# Patient Record
Sex: FEMALE | Race: WHITE | Employment: FULL TIME | URBAN - METROPOLITAN AREA
[De-identification: names, ages, dates, MRNs, and addresses within clinical notes are randomized per-mention and may not be internally consistent; named-entity substitution may affect disease eponyms.]

---

## 2018-03-20 ENCOUNTER — OFFICE VISIT (OUTPATIENT)
Dept: PRIMARY CARE CLINIC | Age: 33
End: 2018-03-20

## 2018-03-20 VITALS
BODY MASS INDEX: 25.33 KG/M2 | OXYGEN SATURATION: 98 % | SYSTOLIC BLOOD PRESSURE: 114 MMHG | DIASTOLIC BLOOD PRESSURE: 82 MMHG | HEART RATE: 95 BPM | TEMPERATURE: 98.7 F | WEIGHT: 152 LBS | HEIGHT: 65 IN | RESPIRATION RATE: 16 BRPM

## 2018-03-20 DIAGNOSIS — B96.89 ACUTE BACTERIAL SINUSITIS: Primary | ICD-10-CM

## 2018-03-20 DIAGNOSIS — J01.90 ACUTE BACTERIAL SINUSITIS: Primary | ICD-10-CM

## 2018-03-20 PROCEDURE — 99203 OFFICE O/P NEW LOW 30 MIN: CPT | Performed by: NURSE PRACTITIONER

## 2018-03-20 RX ORDER — AMOXICILLIN AND CLAVULANATE POTASSIUM 875; 125 MG/1; MG/1
1 TABLET, FILM COATED ORAL 2 TIMES DAILY
Qty: 20 TABLET | Refills: 0 | Status: SHIPPED | OUTPATIENT
Start: 2018-03-20 | End: 2018-03-30

## 2018-03-20 ASSESSMENT — PATIENT HEALTH QUESTIONNAIRE - PHQ9
SUM OF ALL RESPONSES TO PHQ9 QUESTIONS 1 & 2: 0
1. LITTLE INTEREST OR PLEASURE IN DOING THINGS: 0
2. FEELING DOWN, DEPRESSED OR HOPELESS: 0
SUM OF ALL RESPONSES TO PHQ QUESTIONS 1-9: 0

## 2018-03-20 NOTE — PROGRESS NOTES
CHIEF COMPLAINT    Chief Complaint   Patient presents with    URI     cough, chest/nasal congestion(clear), headaches, and sore throat x 1 week. no otc meds. HPI    Andy Romberg is a 33 y.o. female who presents with sore throat and now a cough since the onset last wednesday. The duration has been constant since the onset. The context is The patient started with a sore throat last Wednesday and then started with a cough this past Saturday. 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 .  The pain severity is 7/10. There are no alleviating factors. REVIEW OF SYSTEMS    Pulmonary: No difficulty breathing or hemoptysis  General: No fevers or syncope  GI: No vomiting or diarrhea  See HPI for further details. PAST MEDICAL AND SURGICAL HISTORY    Past Medical History:   Diagnosis Date    Diabetes mellitus ([facility] [address]     Gestational-diet controlled.      Past Surgical History:   Procedure Laterality Date    WISDOM TOOTH EXTRACTION         CURRENT MEDICATIONS    Current Outpatient Rx   Medication Sig Dispense Refill    Norgestim-Eth Estrad Triphasic (TRI-SPRINTEC PO) Take by mouth daily      amoxicillin-clavulanate (AUGMENTIN) 875-125 MG per tablet Take 1 tablet by mouth 2 times daily for 10 days 20 tablet 0       ALLERGIES    No Known Allergies    FAMILY AND SOCIAL HISTORY    Family History   Problem Relation Age of Onset    Heart Disease Mother     Arthritis Father     Learning Disabilities Sister     Learning Disabilities Brother     Miscarriages / Stillbirths Maternal Aunt     Cancer Maternal Grandmother     Miscarriages / Stillbirths Maternal Grandmother     Arthritis Paternal Grandmother     Cancer Paternal Grandfather      Social History     Social History    Marital status:      Spouse name: N/A    Number of children: N/A    Years of education: N/A     Social History Main Topics    Smoking status: Never Smoker    Smokeless tobacco: Never Used    Alcohol use No    Drug

## 2018-04-20 ENCOUNTER — OFFICE VISIT (OUTPATIENT)
Dept: INTERNAL MEDICINE CLINIC | Age: 33
End: 2018-04-20

## 2018-04-20 VITALS
HEART RATE: 76 BPM | WEIGHT: 153 LBS | TEMPERATURE: 98.1 F | RESPIRATION RATE: 16 BRPM | HEIGHT: 66 IN | BODY MASS INDEX: 24.59 KG/M2 | DIASTOLIC BLOOD PRESSURE: 68 MMHG | SYSTOLIC BLOOD PRESSURE: 114 MMHG

## 2018-04-20 DIAGNOSIS — D22.5 MELANOCYTIC NEVUS OF TRUNK: ICD-10-CM

## 2018-04-20 DIAGNOSIS — Z00.00 PREVENTATIVE HEALTH CARE: Primary | ICD-10-CM

## 2018-04-20 PROCEDURE — 99395 PREV VISIT EST AGE 18-39: CPT | Performed by: INTERNAL MEDICINE

## 2018-04-22 ASSESSMENT — ENCOUNTER SYMPTOMS
SHORTNESS OF BREATH: 0
NAUSEA: 0
EYE PAIN: 0
EYE DISCHARGE: 0
TROUBLE SWALLOWING: 0
COUGH: 0
BLOOD IN STOOL: 0
SORE THROAT: 0
CONSTIPATION: 0
DIARRHEA: 0
ABDOMINAL PAIN: 0
RHINORRHEA: 0
WHEEZING: 0
BACK PAIN: 0
ABDOMINAL DISTENTION: 0
VOMITING: 0
APNEA: 0

## 2018-10-04 ENCOUNTER — OFFICE VISIT (OUTPATIENT)
Dept: DERMATOLOGY | Age: 33
End: 2018-10-04
Payer: COMMERCIAL

## 2018-10-04 DIAGNOSIS — D22.9 MULTIPLE BENIGN MELANOCYTIC NEVI: ICD-10-CM

## 2018-10-04 DIAGNOSIS — L81.2 EPHELIDES: ICD-10-CM

## 2018-10-04 DIAGNOSIS — D48.9 NEOPLASM OF UNCERTAIN BEHAVIOR: Primary | ICD-10-CM

## 2018-10-04 DIAGNOSIS — Z78.9 NON-TOBACCO USER: ICD-10-CM

## 2018-10-04 PROCEDURE — 99243 OFF/OP CNSLTJ NEW/EST LOW 30: CPT | Performed by: DERMATOLOGY

## 2018-10-04 PROCEDURE — 11100 PR BIOPSY OF SKIN LESION: CPT | Performed by: DERMATOLOGY

## 2018-10-04 NOTE — PROGRESS NOTES
[facility]) Dermatology  [place], [place], [address]       [address]  1985    29 y.o. female     Date of Visit: 10/4/2018    Chief Complaint:   Chief Complaint   Patient presents with    New Patient    Skin Exam    Lesion(s)     trunk area        I was asked to see this patient by Dr. Jessa Arellano. History of Present Illness:  [address] is a 29 y.o. female who presents with the chief complaint of establish care and for the followin. TBSE. Many year history of multiple nevi on the trunk and extremities, all present for many years. Denies new moles. Denies moles changing in size, shape, color. None associated w/ pain, bleeding, pruritus. 2. Patient states she had a normal biopsy to her right lower back about 10-15 years ago (during her teenage years) pt  Is unsure of dermatologist's name and the dermatologist has since retired. She does not think it was abnormal or dysplastic at that time but she cannot remember. Her primary care physician and noticed the pigment returning to her scar and asked that she have the nevus examined by a dermatologist.  3. Progressive freckling located to sun exposed areas over several years, Admits to sun exposure in youth without wearing sunscreen, hats, or protective clothing. currently wears only SPF 15 sunscreen if outdoors for long periods of time. Review of Systems:  Constitutional: Reports general sense of well-being   Skin: No new or changing moles, no history of keloids or hypertrophic scars. Heme: No abnormal bruising or bleeding. Past Medical History, Family History, Surgical History, Medications and Allergies reviewed. Past Skin Hx:   Patient denies past history of melanoma, NMSC, dysplastic nevi, or chronic skin rashes.     PFHx: Denies hx of MM or NMSC    PSHx:     Family History   Problem Relation Age of Onset    Heart Disease Mother     Arthritis Father     Learning Disabilities Sister    Gary Graves

## 2018-10-16 ENCOUNTER — TELEPHONE (OUTPATIENT)
Dept: DERMATOLOGY | Age: 33
End: 2018-10-16

## 2018-10-16 DIAGNOSIS — C43.9 SUPERFICIAL SPREADING MALIGNANT MELANOMA OF SKIN (HCC): Primary | ICD-10-CM

## 2018-10-16 LAB — DERMATOLOGY PATHOLOGY REPORT: ABNORMAL

## 2018-10-18 ENCOUNTER — TELEPHONE (OUTPATIENT)
Dept: SURGERY | Age: 33
End: 2018-10-18

## 2018-10-23 ENCOUNTER — OFFICE VISIT (OUTPATIENT)
Dept: SURGERY | Age: 33
End: 2018-10-23
Payer: COMMERCIAL

## 2018-10-23 VITALS
DIASTOLIC BLOOD PRESSURE: 64 MMHG | TEMPERATURE: 98.3 F | HEART RATE: 77 BPM | BODY MASS INDEX: 24.75 KG/M2 | WEIGHT: 154 LBS | HEIGHT: 66 IN | SYSTOLIC BLOOD PRESSURE: 106 MMHG | OXYGEN SATURATION: 98 %

## 2018-10-23 DIAGNOSIS — C43.59 MALIGNANT MELANOMA OF TORSO EXCLUDING BREAST (HCC): Primary | ICD-10-CM

## 2018-10-23 PROCEDURE — 99244 OFF/OP CNSLTJ NEW/EST MOD 40: CPT | Performed by: SURGERY

## 2018-10-29 ENCOUNTER — TELEPHONE (OUTPATIENT)
Dept: INTERNAL MEDICINE CLINIC | Age: 33
End: 2018-10-29

## 2018-10-29 NOTE — PROGRESS NOTES
[facility] / [facility] ([facility]) [address] [facility], [address]    Acknowledgment of Informed Consent for Surgical or Medical Procedure and Sedation  I agree to allow doctor(s) Iqra Kitchen and his/her associates or assistants, including residents and/or other qualified medical practitioner to perform the following medical treatment or procedure and to administer or direct the administration of sedation as necessary:  Procedure(s): [address]  My doctor has explained the following regarding the proposed procedure:   the explanation of the procedure   the benefits of the procedure   the potential problems that might occur during recuperation   the risks and side effects of the procedure which could include but are not limited to severe blood loss, infection, stroke or death   the benefits, risks and side effect of alternative procedures including the consequences of declining this procedure or any alternative procedures   the likelihood of achieving satisfactory results. I acknowledge no guarantee or assurance has been made to me regarding the results. I understand that during the course of this treatment/procedure, unforeseen conditions can occur which require an additional or different procedure. I agree to allow my physician or assistants to perform such extension of the original procedure as they may find necessary. I understand that sedation will often result in temporary impairment of memory and fine motor skills and that sedation can occasionally progress to a state of deep sedation or general anesthesia. I understand the risks of anesthesia for surgery include, but are not limited to, sore throat, hoarseness, injury to face, mouth, or teeth; nausea; headache; injury to blood vessels or nerves; death, brain damage, or paralysis.     I understand that if I have a Limitation of Treatment order in effect during my hospitalization, the order may or may not

## 2018-10-30 ENCOUNTER — TELEPHONE (OUTPATIENT)
Dept: DERMATOLOGY | Age: 33
End: 2018-10-30

## 2018-10-30 ENCOUNTER — OFFICE VISIT (OUTPATIENT)
Dept: INTERNAL MEDICINE CLINIC | Age: 33
End: 2018-10-30
Payer: COMMERCIAL

## 2018-10-30 VITALS
SYSTOLIC BLOOD PRESSURE: 102 MMHG | RESPIRATION RATE: 16 BRPM | TEMPERATURE: 99 F | HEIGHT: 66 IN | BODY MASS INDEX: 24.43 KG/M2 | WEIGHT: 152 LBS | DIASTOLIC BLOOD PRESSURE: 60 MMHG | HEART RATE: 76 BPM

## 2018-10-30 DIAGNOSIS — Z01.818 PREOP EXAM FOR INTERNAL MEDICINE: ICD-10-CM

## 2018-10-30 DIAGNOSIS — C43.59 MALIGNANT MELANOMA OF TORSO EXCLUDING BREAST (HCC): Primary | ICD-10-CM

## 2018-10-30 DIAGNOSIS — O24.410 DIET CONTROLLED GESTATIONAL DIABETES MELLITUS (GDM) IN SECOND TRIMESTER: ICD-10-CM

## 2018-10-30 PROCEDURE — 99241 PR OFFICE CONSULTATION NEW/ESTAB PATIENT 15 MIN: CPT | Performed by: INTERNAL MEDICINE

## 2018-10-30 NOTE — PATIENT INSTRUCTIONS
art to relieve stress. Some dance, writing, or art groups may be available just for people who have cancer. ¨ Be kind to your body and mind. Getting enough sleep, eating a healthy diet, and taking time to do things you enjoy can contribute to an overall feeling of balance in your life and help reduce stress. ¨ Get help if you need it. Discuss your concerns with your doctor or counselor. · If you are vomiting or have diarrhea:  ¨ Drink plenty of fluids (enough so that your urine is light yellow or clear like water) to prevent dehydration. Choose water and other caffeine-free clear liquids. If you have kidney, heart, or liver disease and have to limit fluids, talk with your doctor before you increase the amount of fluids you drink. ¨ When you are able to eat, try clear soups, mild foods, and liquids until all symptoms are gone for 12 to 48 hours. Other good choices include dry toast, crackers, cooked cereal, and gelatin dessert, such as Jell-O.  · Do not smoke. Smoking can slow healing. If you need help quitting, talk to your doctor about stop-smoking programs and medicines. These can increase your chances of quitting for good. · If you have not already done so, prepare a list of advance directives. Advance directives are instructions to your doctor and family members about what kind of care you want if you become unable to speak or express yourself. Protect your skin  · Always wear sunscreen on exposed skin. Make sure to use a broad-spectrum sunscreen that has a sun protection factor (SPF) of 30 or higher. Use it every day, even when it is cloudy. While you are outdoors, apply more sunscreen every 2 to 3 hours or anytime your skin gets wet. · Wear a wide-brimmed hat, a long-sleeved shirt, and pants if you are going to be outdoors for very long. · Stay out of the sun during the midday hours (10 a.m. to 4 p.m.), when UV rays are strongest.  · Avoid sunlamps and tanning salons.   When should you call for help?  Call your doctor now or seek immediate medical care if:    · You have signs of infection, such as:  ¨ Increased pain, swelling, warmth, or redness. ¨ Red streaks leading from the area. ¨ Pus draining from the area. ¨ A fever.    Watch closely for changes in your health, and be sure to contact your doctor if:    · You see a change in your skin, such as a growth or mole that:  ¨ Grows bigger. This may happen very slowly. ¨ Changes color. ¨ Changes shape. ¨ Starts to bleed easily.     · You have swollen glands in your armpits, groin, or neck.     · You do not get better as expected. Where can you learn more? Go to https://Dopios.Demeure. org and sign in to your Exclusively.in account. Enter E206 in the [employer] box to learn more about \"Melanoma: Care Instructions. \"     If you do not have an account, please click on the \"Sign Up Now\" link. Current as of: May 12, 2017  Content Version: 11.7  © 9413-0704 [employer]. Care instructions adapted under license by [facility] ([facility]). If you have questions about a medical condition or this instruction, always ask your healthcare professional. [address] any warranty or liability for your use of this information.

## 2018-10-30 NOTE — PROGRESS NOTES
Pre-operative evaluation    10/30/2018    Yolanda Bursn  [id number]    Mr. Yolanda Burns is here for a preoperative evaluation. She is planned for melanoma removal 11/7/2018, it's 4mm depth currently. Has hx of gestational DM, no other issues. Patient denies chest pain, SOB, NVD, FC, rash, malaise, rigor, dizziness/lightheadness, other pertinent ROS was also reviewed. /60 (Site: Right Upper Arm, Position: Sitting, Cuff Size: Medium Adult)   Pulse 76 Comment: Regular  Temp 99 °F (37.2 °C) (Oral)   Resp 16   Ht 5' 6\" (1.676 m)   Wt 152 lb (68.9 kg)   LMP 10/10/2018   BMI 24.53 kg/m²     Gen: Patient appears well groomed, well appearing  HEAD: Atraumatic, normocephalic,   Eyes: PERRLA, EOMI   Neck: supple, no thyroid nodule appreciated, no JVD  Chest: Clear to auscultation KEN, unlabored breathing, normal expansion  Heart: Regular rate, regular rhythm, no murmur, no rub  Abdomen: Non-tender, non-distended, bowel sounds present x3  Extremities: no edema, distal pulses intact  Patient was alert and oriented to person, place and time  Integument: right lower lumbar region with excision scar and keratosis noted ~1.5cm diameter    [address], [address] was seen today for pre-op exam and health maintenance. Diagnoses and all orders for this visit:    Preop exam for internal medicine  Malignant melanoma of torso excluding breast ([facility] [address]  Preoperative evaluation- from a cardiovascular stand point, patient is at low to moderate risk for proposed operation, but the risk is acceptable. Risk of complications of any surgery, including but not limited to MI, CVA, and death was explained. From a medical perspective the proposed surgery is urgent and is warranted since benefit>risks. Clear to proceed from medical perspective. Diet controlled gestational diabetes mellitus (GDM) in second trimester  Controlled?   Recheck A1c, she did not get the labs done ordered by previous PCP, will get done today    Current

## 2018-11-06 ENCOUNTER — ANESTHESIA EVENT (OUTPATIENT)
Dept: OPERATING ROOM | Age: 33
End: 2018-11-06
Payer: COMMERCIAL

## 2018-11-07 ENCOUNTER — ANESTHESIA (OUTPATIENT)
Dept: OPERATING ROOM | Age: 33
End: 2018-11-07
Payer: COMMERCIAL

## 2018-11-07 ENCOUNTER — HOSPITAL ENCOUNTER (OUTPATIENT)
Age: 33
Setting detail: OUTPATIENT SURGERY
Discharge: HOME | End: 2018-11-07
Attending: SURGERY | Admitting: SURGERY
Payer: COMMERCIAL

## 2018-11-07 VITALS
BODY MASS INDEX: 24.11 KG/M2 | SYSTOLIC BLOOD PRESSURE: 121 MMHG | HEART RATE: 88 BPM | RESPIRATION RATE: 16 BRPM | OXYGEN SATURATION: 99 % | DIASTOLIC BLOOD PRESSURE: 77 MMHG | HEIGHT: 66 IN | TEMPERATURE: 98.2 F | WEIGHT: 150 LBS

## 2018-11-07 VITALS — DIASTOLIC BLOOD PRESSURE: 84 MMHG | SYSTOLIC BLOOD PRESSURE: 129 MMHG | OXYGEN SATURATION: 99 %

## 2018-11-07 DIAGNOSIS — C43.59 MALIGNANT MELANOMA OF TORSO EXCLUDING BREAST (HCC): Primary | ICD-10-CM

## 2018-11-07 LAB — PREGNANCY, URINE: NEGATIVE

## 2018-11-07 PROCEDURE — 13101 CMPLX RPR TRUNK 2.6-7.5 CM: CPT | Performed by: SURGERY

## 2018-11-07 PROCEDURE — 3700000000 HC ANESTHESIA ATTENDED CARE: Performed by: SURGERY

## 2018-11-07 PROCEDURE — 2580000003 HC RX 258: Performed by: SURGERY

## 2018-11-07 PROCEDURE — 3600000002 HC SURGERY LEVEL 2 BASE: Performed by: SURGERY

## 2018-11-07 PROCEDURE — 88305 TISSUE EXAM BY PATHOLOGIST: CPT

## 2018-11-07 PROCEDURE — 2500000003 HC RX 250 WO HCPCS: Performed by: SURGERY

## 2018-11-07 PROCEDURE — 7100000011 HC PHASE II RECOVERY - ADDTL 15 MIN: Performed by: SURGERY

## 2018-11-07 PROCEDURE — 2580000003 HC RX 258: Performed by: NURSE ANESTHETIST, CERTIFIED REGISTERED

## 2018-11-07 PROCEDURE — 7100000001 HC PACU RECOVERY - ADDTL 15 MIN: Performed by: SURGERY

## 2018-11-07 PROCEDURE — 6360000002 HC RX W HCPCS: Performed by: SURGERY

## 2018-11-07 PROCEDURE — 2580000003 HC RX 258: Performed by: ANESTHESIOLOGY

## 2018-11-07 PROCEDURE — 84703 CHORIONIC GONADOTROPIN ASSAY: CPT

## 2018-11-07 PROCEDURE — 2500000003 HC RX 250 WO HCPCS: Performed by: NURSE ANESTHETIST, CERTIFIED REGISTERED

## 2018-11-07 PROCEDURE — 3600000012 HC SURGERY LEVEL 2 ADDTL 15MIN: Performed by: SURGERY

## 2018-11-07 PROCEDURE — 7100000010 HC PHASE II RECOVERY - FIRST 15 MIN: Performed by: SURGERY

## 2018-11-07 PROCEDURE — 7100000000 HC PACU RECOVERY - FIRST 15 MIN: Performed by: SURGERY

## 2018-11-07 PROCEDURE — 3700000001 HC ADD 15 MINUTES (ANESTHESIA): Performed by: SURGERY

## 2018-11-07 PROCEDURE — 13102 CMPLX RPR TRUNK ADDL 5CM/<: CPT | Performed by: SURGERY

## 2018-11-07 PROCEDURE — 6360000002 HC RX W HCPCS: Performed by: NURSE ANESTHETIST, CERTIFIED REGISTERED

## 2018-11-07 PROCEDURE — 2709999900 HC NON-CHARGEABLE SUPPLY: Performed by: SURGERY

## 2018-11-07 PROCEDURE — 11604 EXC TR-EXT MAL+MARG 3.1-4 CM: CPT | Performed by: SURGERY

## 2018-11-07 RX ORDER — MAGNESIUM HYDROXIDE 1200 MG/15ML
LIQUID ORAL CONTINUOUS PRN
Status: DISCONTINUED | OUTPATIENT
Start: 2018-11-07 | End: 2018-11-07 | Stop reason: HOSPADM

## 2018-11-07 RX ORDER — BUPIVACAINE HYDROCHLORIDE AND EPINEPHRINE 5; 5 MG/ML; UG/ML
INJECTION, SOLUTION EPIDURAL; INTRACAUDAL; PERINEURAL PRN
Status: DISCONTINUED | OUTPATIENT
Start: 2018-11-07 | End: 2018-11-07 | Stop reason: HOSPADM

## 2018-11-07 RX ORDER — PROPOFOL 10 MG/ML
INJECTION, EMULSION INTRAVENOUS PRN
Status: DISCONTINUED | OUTPATIENT
Start: 2018-11-07 | End: 2018-11-07 | Stop reason: SDUPTHER

## 2018-11-07 RX ORDER — ONDANSETRON 2 MG/ML
INJECTION INTRAMUSCULAR; INTRAVENOUS PRN
Status: DISCONTINUED | OUTPATIENT
Start: 2018-11-07 | End: 2018-11-07 | Stop reason: SDUPTHER

## 2018-11-07 RX ORDER — SODIUM CHLORIDE, SODIUM LACTATE, POTASSIUM CHLORIDE, CALCIUM CHLORIDE 600; 310; 30; 20 MG/100ML; MG/100ML; MG/100ML; MG/100ML
INJECTION, SOLUTION INTRAVENOUS CONTINUOUS
Status: DISCONTINUED | OUTPATIENT
Start: 2018-11-07 | End: 2018-11-07 | Stop reason: HOSPADM

## 2018-11-07 RX ORDER — LIDOCAINE HYDROCHLORIDE 10 MG/ML
1 INJECTION, SOLUTION EPIDURAL; INFILTRATION; INTRACAUDAL; PERINEURAL
Status: DISCONTINUED | OUTPATIENT
Start: 2018-11-07 | End: 2018-11-07 | Stop reason: HOSPADM

## 2018-11-07 RX ORDER — FENTANYL CITRATE 50 UG/ML
50 INJECTION, SOLUTION INTRAMUSCULAR; INTRAVENOUS EVERY 5 MIN PRN
Status: DISCONTINUED | OUTPATIENT
Start: 2018-11-07 | End: 2018-11-07 | Stop reason: HOSPADM

## 2018-11-07 RX ORDER — SODIUM CHLORIDE 0.9 % (FLUSH) 0.9 %
10 SYRINGE (ML) INJECTION PRN
Status: DISCONTINUED | OUTPATIENT
Start: 2018-11-07 | End: 2018-11-07 | Stop reason: HOSPADM

## 2018-11-07 RX ORDER — FENTANYL CITRATE 50 UG/ML
25 INJECTION, SOLUTION INTRAMUSCULAR; INTRAVENOUS EVERY 5 MIN PRN
Status: DISCONTINUED | OUTPATIENT
Start: 2018-11-07 | End: 2018-11-07 | Stop reason: HOSPADM

## 2018-11-07 RX ORDER — SODIUM CHLORIDE, SODIUM LACTATE, POTASSIUM CHLORIDE, CALCIUM CHLORIDE 600; 310; 30; 20 MG/100ML; MG/100ML; MG/100ML; MG/100ML
INJECTION, SOLUTION INTRAVENOUS CONTINUOUS PRN
Status: DISCONTINUED | OUTPATIENT
Start: 2018-11-07 | End: 2018-11-07 | Stop reason: SDUPTHER

## 2018-11-07 RX ORDER — SODIUM CHLORIDE 0.9 % (FLUSH) 0.9 %
10 SYRINGE (ML) INJECTION EVERY 12 HOURS SCHEDULED
Status: DISCONTINUED | OUTPATIENT
Start: 2018-11-07 | End: 2018-11-07 | Stop reason: HOSPADM

## 2018-11-07 RX ORDER — LIDOCAINE HYDROCHLORIDE 20 MG/ML
INJECTION, SOLUTION INFILTRATION; PERINEURAL PRN
Status: DISCONTINUED | OUTPATIENT
Start: 2018-11-07 | End: 2018-11-07 | Stop reason: SDUPTHER

## 2018-11-07 RX ORDER — OXYCODONE HYDROCHLORIDE AND ACETAMINOPHEN 5; 325 MG/1; MG/1
1 TABLET ORAL EVERY 6 HOURS PRN
Qty: 20 TABLET | Refills: 0 | Status: SHIPPED | OUTPATIENT
Start: 2018-11-07 | End: 2018-11-14

## 2018-11-07 RX ORDER — ONDANSETRON 2 MG/ML
4 INJECTION INTRAMUSCULAR; INTRAVENOUS
Status: DISCONTINUED | OUTPATIENT
Start: 2018-11-07 | End: 2018-11-07 | Stop reason: HOSPADM

## 2018-11-07 RX ORDER — DEXAMETHASONE SODIUM PHOSPHATE 4 MG/ML
INJECTION, SOLUTION INTRA-ARTICULAR; INTRALESIONAL; INTRAMUSCULAR; INTRAVENOUS; SOFT TISSUE PRN
Status: DISCONTINUED | OUTPATIENT
Start: 2018-11-07 | End: 2018-11-07 | Stop reason: SDUPTHER

## 2018-11-07 RX ORDER — CEFAZOLIN SODIUM 2 G/50ML
2 SOLUTION INTRAVENOUS ONCE
Status: COMPLETED | OUTPATIENT
Start: 2018-11-07 | End: 2018-11-07

## 2018-11-07 RX ORDER — PROMETHAZINE HYDROCHLORIDE 25 MG/ML
6.25 INJECTION, SOLUTION INTRAMUSCULAR; INTRAVENOUS
Status: DISCONTINUED | OUTPATIENT
Start: 2018-11-07 | End: 2018-11-07 | Stop reason: HOSPADM

## 2018-11-07 RX ORDER — PROPOFOL 10 MG/ML
INJECTION, EMULSION INTRAVENOUS CONTINUOUS PRN
Status: DISCONTINUED | OUTPATIENT
Start: 2018-11-07 | End: 2018-11-07 | Stop reason: SDUPTHER

## 2018-11-07 RX ADMIN — PROPOFOL 50 MG: 10 INJECTION, EMULSION INTRAVENOUS at 14:34

## 2018-11-07 RX ADMIN — PROPOFOL 190 MCG/KG/MIN: 10 INJECTION, EMULSION INTRAVENOUS at 14:34

## 2018-11-07 RX ADMIN — CEFAZOLIN SODIUM 2 G: 2 SOLUTION INTRAVENOUS at 14:29

## 2018-11-07 RX ADMIN — DEXAMETHASONE SODIUM PHOSPHATE 8 MG: 4 INJECTION, SOLUTION INTRAMUSCULAR; INTRAVENOUS at 14:35

## 2018-11-07 RX ADMIN — SODIUM CHLORIDE, SODIUM LACTATE, POTASSIUM CHLORIDE, AND CALCIUM CHLORIDE: 600; 310; 30; 20 INJECTION, SOLUTION INTRAVENOUS at 15:21

## 2018-11-07 RX ADMIN — LIDOCAINE HYDROCHLORIDE 100 MG: 20 INJECTION, SOLUTION INFILTRATION; PERINEURAL at 14:34

## 2018-11-07 RX ADMIN — HYDROMORPHONE HYDROCHLORIDE 1 MG: 1 INJECTION, SOLUTION INTRAMUSCULAR; INTRAVENOUS; SUBCUTANEOUS at 14:31

## 2018-11-07 RX ADMIN — ONDANSETRON 7 MG: 2 INJECTION INTRAMUSCULAR; INTRAVENOUS at 14:24

## 2018-11-07 RX ADMIN — SODIUM CHLORIDE, SODIUM LACTATE, POTASSIUM CHLORIDE, AND CALCIUM CHLORIDE: 600; 310; 30; 20 INJECTION, SOLUTION INTRAVENOUS at 13:16

## 2018-11-07 RX ADMIN — HYDROMORPHONE HYDROCHLORIDE 1 MG: 1 INJECTION, SOLUTION INTRAMUSCULAR; INTRAVENOUS; SUBCUTANEOUS at 14:24

## 2018-11-07 RX ADMIN — SODIUM CHLORIDE, POTASSIUM CHLORIDE, SODIUM LACTATE AND CALCIUM CHLORIDE: 600; 310; 30; 20 INJECTION, SOLUTION INTRAVENOUS at 12:30

## 2018-11-07 ASSESSMENT — PAIN - FUNCTIONAL ASSESSMENT: PAIN_FUNCTIONAL_ASSESSMENT: 0-10

## 2018-11-07 ASSESSMENT — PULMONARY FUNCTION TESTS
PIF_VALUE: 0
PIF_VALUE: 1
PIF_VALUE: 0
PIF_VALUE: 1
PIF_VALUE: 1
PIF_VALUE: 0
PIF_VALUE: 0

## 2018-11-07 ASSESSMENT — PAIN DESCRIPTION - DESCRIPTORS: DESCRIPTORS: ACHING

## 2018-11-07 ASSESSMENT — PAIN SCALES - GENERAL: PAINLEVEL_OUTOF10: 0

## 2018-11-07 NOTE — ANESTHESIA PRE PROCEDURE
BP Readings from Last 3 Encounters:   11/07/18 121/83   10/30/18 102/60   10/23/18 106/64       NPO Status:                                                                                 BMI:   Wt Readings from Last 3 Encounters:   11/07/18 150 lb (68 kg)   10/30/18 152 lb (68.9 kg)   10/23/18 154 lb (69.9 kg)     Body mass index is 24.21 kg/m². CBC:   Lab Results   Component Value Date    WBC 8.0 10/30/2018    RBC 4.68 10/30/2018    HGB 14.6 10/30/2018    HCT 44.2 10/30/2018    MCV 94.3 10/30/2018    RDW 14.0 10/30/2018     10/30/2018       CMP:   Lab Results   Component Value Date     10/30/2018    K 4.5 10/30/2018     10/30/2018    CO2 26 10/30/2018    BUN 14 10/30/2018    CREATININE 0.7 10/30/2018    GFRAA >60 10/30/2018    GFRAA >60 11/17/2011    LABGLOM >60 10/30/2018    GLUCOSE 75 10/30/2018    CALCIUM 9.8 10/30/2018       POC Tests: No results for input(s): POCGLU, POCNA, POCK, POCCL, POCBUN, POCHEMO, POCHCT in the last 72 hours.     Coags: No results found for: PROTIME, INR, APTT    HCG (If Applicable): No results found for: PREGTESTUR, PREGSERUM, HCG, HCGQUANT     ABGs: No results found for: PHART, PO2ART, WLF1GKJ, HFS6WNX, BEART, G2OGWIBB     Type & Screen (If Applicable):  Lab Results   Component Value Date    LABABO A 04/15/2013    [address] Positive 04/15/2013       Anesthesia Evaluation  Patient summary reviewed and Nursing notes reviewed no history of anesthetic complications:   Airway: Mallampati: II  TM distance: >3 FB   Neck ROM: full  Mouth opening: > = 3 FB Dental: normal exam         Pulmonary:Negative Pulmonary ROS and normal exam  breath sounds clear to auscultation                             Cardiovascular:Negative CV ROS            Rhythm: regular  Rate: normal                    Neuro/Psych:   Negative Neuro/Psych ROS  (+) headaches: migraine headaches,             GI/Hepatic/Renal: Neg GI/Hepatic/Renal ROS       (-) hiatal hernia and

## 2018-11-07 NOTE — PROGRESS NOTES
PACU Transfer to \[facility]\""    Vitals:    11/07/18 1615   BP: 128/76   Pulse: 80   Resp: 14   Temp: 99.6 °F (37.6 °C)   SpO2: 98%         Intake/Output Summary (Last 24 hours) at 11/07/18 1621  Last data filed at 11/07/18 1615   Gross per 24 hour   Intake             2250 ml   Output               10 ml   Net             2240 ml   IV w/850 LTC    Pain assessment: Received local intra op; has consistently denied pain in PACU  Pain Level: 0  Surgical assessment good. Transferred in stretcher to \[facility]\""#5 by transporter, to progress to d/c.   Wait room called: will send family member to \[facility]\""#5 to join pt      11/7/2018 4:21 PM

## 2018-11-12 ENCOUNTER — TELEPHONE (OUTPATIENT)
Dept: SURGERY | Age: 33
End: 2018-11-12

## 2018-11-12 NOTE — TELEPHONE ENCOUNTER
Follow up scheduled and patient request paperwork faxed to employer and return to work date of Wednesday 11-14-18 with no restrictions.

## 2018-11-12 NOTE — OP NOTE
Lane Joy  1985    PREOPERATIVE DIAGNOSIS: Malignant Melanoma back     POSTOPERATIVE DIAGNOSIS: Same     PROCEDURE PERFORMED: Wide local excision and advancement flap closure    ATTENDING SURGEON: MD Earl Rucker MD     ANESTHESIA: MAC and local.     BLOOD LOSS: 5 ml   COMPLICATIONS : None  DRAINS: None   SPECIMENS REMOVED: tumor with skin     INDICATIONS FOR OPERATION:  Discussed with patient about wide excision of the melanoma. The risks of surgery include infection, bleeding, and recurrence of the lesion were explained. Patient verbalized understanding of the risks and benefits and wishes to proceed. DETAILS OF PROCEDURE: Patient was identified in the preoperative area and brought to the operating room. Monitored anesthesia given. Operative site is prepped and draped in a sterile manner. Timeout procedure was performed confirming the procedure, site and administration of antibiotics. Lesion and margin marked for the excision. The lesion was measuring 1.3 cm x 1 cm. A margin of 1 cm was taken on all sides and it was converted into an eyeball shape to allow for the closure. Local anesthesia infiltrated. Skin incised with scalpel. Incision deepened to subcutaneous tissues. Electrocautery used for further division of tissues. Skin along with subcutaneous fat until fascia excised and oriented with sutures. Specimen sent to pathology. Hemostasis checked and achieved. Because the defect size, flaps are raised on all sides with diathermy. Flaps are raised above the fascia. The wound was closed in layers with interrupted 2-0 Vicryl subcutaneous and 3-0 Vicryl deep dermal sutures. Skin was closed with 3-0 vicryl dermal sutures. Derma flex with preneo placed over the closed incision. Telfa, gauze and tegaderm clear dressing applied. The patient tolerated the procedure well. Patient was awakened and transferred to the recovery room uneventfully.    I was present for the entire procedure.     Halley Caldwell MD  Surgical Oncologist

## 2018-11-13 NOTE — PROGRESS NOTES
Vandana Alvaradoseema is here for postop evaluation. She is gradually improving since then. Minimal pain now. No other complaints. O/E: Alert, oriented x 3, sitting comfortably with out any discomfort  No respiratory distress  Postop wound - healing well. A/P: S/P Melanoma wide excision 11/7/18, doing well  No postop complications  Path as below - follow with dermatologist, Dr. Tidwell Later  Follow up in 6 months    Pathology: FINAL DIAGNOSIS:    Skin lower back, excision for melanoma:  * Skin and subcutaneous tissue showing changes consistent with previous  resection/biopsy. * No evidence of residual melanoma.

## 2018-11-16 ENCOUNTER — OFFICE VISIT (OUTPATIENT)
Dept: SURGERY | Age: 33
End: 2018-11-16

## 2018-11-16 VITALS
WEIGHT: 155 LBS | DIASTOLIC BLOOD PRESSURE: 79 MMHG | HEART RATE: 81 BPM | OXYGEN SATURATION: 100 % | HEIGHT: 66 IN | BODY MASS INDEX: 24.91 KG/M2 | SYSTOLIC BLOOD PRESSURE: 124 MMHG | TEMPERATURE: 97.5 F

## 2018-11-16 DIAGNOSIS — C43.59 MALIGNANT MELANOMA OF TORSO EXCLUDING BREAST (HCC): Primary | ICD-10-CM

## 2018-11-16 DIAGNOSIS — Z98.890 POSTOPERATIVE STATE: ICD-10-CM

## 2018-11-16 PROCEDURE — 99024 POSTOP FOLLOW-UP VISIT: CPT | Performed by: SURGERY

## 2019-01-22 ENCOUNTER — OFFICE VISIT (OUTPATIENT)
Dept: DERMATOLOGY | Age: 34
End: 2019-01-22
Payer: COMMERCIAL

## 2019-01-22 DIAGNOSIS — Z85.820 HISTORY OF MALIGNANT MELANOMA: ICD-10-CM

## 2019-01-22 DIAGNOSIS — D22.9 MULTIPLE BENIGN MELANOCYTIC NEVI: Primary | ICD-10-CM

## 2019-01-22 PROCEDURE — 99213 OFFICE O/P EST LOW 20 MIN: CPT | Performed by: DERMATOLOGY

## 2019-04-19 ENCOUNTER — OFFICE VISIT (OUTPATIENT)
Dept: DERMATOLOGY | Age: 34
End: 2019-04-19
Payer: COMMERCIAL

## 2019-04-19 DIAGNOSIS — Z85.820 HISTORY OF MELANOMA: ICD-10-CM

## 2019-04-19 DIAGNOSIS — D22.9 MULTIPLE BENIGN MELANOCYTIC NEVI: Primary | ICD-10-CM

## 2019-04-19 PROCEDURE — 99213 OFFICE O/P EST LOW 20 MIN: CPT | Performed by: DERMATOLOGY

## 2019-04-19 NOTE — PROGRESS NOTES
[facility]) Dermatology  [facility] [place], [facility]       Nora Neal  1985    29 y.o. female     Date of Visit: 2019    Chief Complaint:   Chief Complaint   Patient presents with    Follow-up    Skin Lesion     moles, TBSE        I was asked to see this patient by Dr. Odell ref. provider found. History of Present Illness:  Nora Neal is a 29 y.o. female who presents with the chief complaint of the followin. Total body skin cancer screening, hx of melanoma, denies recurrence. 2. Many year history of multiple nevi on the trunk and extremities, all present for many years. Denies new moles. Denies moles changing in size, shape, color. None associated w/ pain, bleeding, pruritus.      -Admits to sun exposure in youth without wearing sunscreen, hats, or protective clothing.    -Tanning bed use: yes, stopped over 10 years ago  -Occupation: indoor   -Uses sunscreen and/or wears protective clothing: yes when outdoors in warmer months, does not wear a daily moisturizer with sunscreen    Review of Systems:  Constitutional: Reports general sense of well-being   Skin: No new or changing moles, no history of keloids or hypertrophic scars. Heme: No abnormal bruising or bleeding. Past Medical History, Family History, Surgical History, Medications and Allergies reviewed.     Past Skin Hx:  Right inferior back- superficial spreading malignant melanoma Stage 1a, Decision dx: class 1a excised with WLE by Dr. Luiza Watson on 18  Patient denies past history of melanoma, NMSC, dysplastic nevi, or chronic skin rashes.     PFHx: Denies hx of MM or NMSC      Family History   Problem Relation Age of Onset    Heart Disease Mother     Arthritis Father     Learning Disabilities Sister     Learning Disabilities Brother     Miscarriages / Stillbirths Maternal Aunt     Cancer Maternal Grandmother     Miscarriages / Stillbirths Maternal Grandmother     Arthritis Paternal Grandmother     Cancer Paternal Grandfather      Past Medical History:   Diagnosis Date    Migraine      Past Surgical History:   Procedure Laterality Date    NV EXC SKIN BENIG <5MM TRUNK,ARM,LEG N/A 11/7/2018    EXCISION OF BACK MELANOMA performed by Sage Doan MD at [address] EXTRACTION         No Known Allergies  Outpatient Medications Marked as Taking for the 4/19/19 encounter (Office Visit) with Sejal Orozco, DO   Medication Sig Dispense Refill    Norgestim-Eth Estrad Triphasic (TRI-SPRINTEC PO) Take by mouth daily         Social History:   Social History     Socioeconomic History    Marital status:      Spouse name: Not on file    Number of children: Not on file    Years of education: Not on file    Highest education level: Not on file   Occupational History    Not on file   Social Needs    Financial resource strain: Not on file    Food insecurity:     Worry: Not on file     Inability: Not on file    Transportation needs:     Medical: Not on file     Non-medical: Not on file   Tobacco Use    Smoking status: Never Smoker    Smokeless tobacco: Never Used   Substance and Sexual Activity    Alcohol use: Yes     Comment: Occasionally    Drug use: No    Sexual activity: Yes     Partners: Male   Lifestyle    Physical activity:     Days per week: Not on file     Minutes per session: Not on file    Stress: Not on file   Relationships    Social connections:     Talks on phone: Not on file     Gets together: Not on file     Attends Judaism service: Not on file     Active member of club or organization: Not on file     Attends meetings of clubs or organizations: Not on file     Relationship status: Not on file    Intimate partner violence:     Fear of current or ex partner: Not on file     Emotionally abused: Not on file     Physically abused: Not on file     Forced sexual activity: Not on file   Other Topics Concern    Not on file   Social History Narrative    Not on file Physical Examination     The following were examined and determined to be normal: Psych/Neuro, Scalp/hair, Head/face, Conjunctivae/eyelids, Gums/teeth/lips, Neck, Breast/axilla/chest, Abdomen, Back, RUE, LUE, RLE, LLE and Nails/digits. Groin/buttocks. Areas covered by underwear garment(s) not examined. The following were examined and determined to be abnormal: none. -General: NAD, well-nourished, well-developed.  -Lymphatics: No palpable lymph nodes to cervical, supraclavicular, axillary areas  Total body skin exam performed, areas examined listed above:   1. Scattered on the head,neck, trunk and extremities are multiple well-defined round and oval symmetric smoothly-bordered uniformly brown macules and papules. no change in size/shape/color of any lesions; no bleeding lesions. 2. Right inferior back- Well healed scar at site of MM, no evidence of recurrent pigmentation, no nodules on palpation. Assessment and Plan     1. Multiple benign melanocytic nevi    2. History of melanoma        1. Multiple benign melanocytic nevi  Benign acquired melanocytic nevi  -Recommend monthly self skin exams   -Educated regarding the ABCDEs of melanoma detection   -Call for any new/changing moles or concerning lesions  -Reviewed sun protective behavior -- sun avoidance during the peak hours of the day, sun-protective clothing (including hat and sunglasses), sunscreen use (water resistant, broad spectrum, SPF at least 30, need for reapplication every 2 to 3 hours), avoidance of tanning beds   -Plan: Observation with annual skin checks (earlier if indicated) performed in office to monitor current nevi and to assess for new lesions.     2. History of melanoma  -Well healed scar at site of prior melanoma, no evidence of recurrence  -pt to call if notices any changes in size, shape, color or experiences bleeding/pain/itching of moles  -Recommend follow-up annually with an ophthalmologist.  -understands risk of recurrence and the need to monitor skin for changes    RTC 3 months for TBSE        Note is transcribed using voice recognition software. Inadvertent computerized transcription errors may be present. Return in about 3 months (around 7/19/2019) for TBSE.

## 2019-05-09 NOTE — PROGRESS NOTES
Melanoma Follow-up  Eladio Hansen MD  Surgical Oncology  [phone]    Date of service: 5/14/2019     Estella Sosa    Initial Melanoma:   Location: Right medial inferior back. Date initially Treated:  11/7/18. Thickness 0.4 mm. Ulceration  No                                Mitotic Rate 1 /mm2      Regression  No  Stage [id number]    Subsequent Melanoma: no    Estella Sosa returns for follow up of her melanoma of the back. She is basically doing well. Today She denies new subcutaneous mass, headache, bone pain, cough, abdominal pain, or suspicious new lesions. She is being followed by Dr. Mayra Lieberman. No other complaints. Review of systems is otherwise negative    Past medical history, Past surgical history, Social history, Family history and allergies reviewed and updated. Vitals:    05/14/19 0906   BP: 120/74   Pulse: 83   Temp: 98.7 °F (37.1 °C)   TempSrc: Oral   SpO2: 99%   Weight: 155 lb (70.3 kg)   Height: 5' 6\" (1.676 m)     Wt Readings from Last 3 Encounters:   05/14/19 155 lb (70.3 kg)   11/16/18 155 lb (70.3 kg)   11/07/18 150 lb (68 kg)     Body mass index is 25.02 kg/m². O/E:   Constitutional: Patient is oriented to person, place, and time. No distress. HENT: mucus membranes - moist. No scleral icterus. Neck: Supple and normal range of motion. No bilateral lymphadenopathy present. Pulmonary/Chest: Effort normal. No respiratory distress. Abdominal: Soft. No distension and no mass. No tenderness. No Hepatosplenomegaly. Extremities: no edema and no tenderness. Neurological: Grossly intact motor and sensory exam  Skin: Skin is warm and dry. No rash noted. She is not diaphoretic. Surgical site:    Incision normal: Yes   Surrounding nodularity: No   Abnormal pigmentation: No   Other lesions: No  Lymph nodes: Palpable regional lymph nodes: No  Psychiatric: She has a normal mood and affect.  Her behavior is normal. Judgment and thought content normal.       A/P:

## 2019-05-14 ENCOUNTER — OFFICE VISIT (OUTPATIENT)
Dept: SURGERY | Age: 34
End: 2019-05-14
Payer: COMMERCIAL

## 2019-05-14 VITALS
TEMPERATURE: 98.7 F | HEIGHT: 66 IN | SYSTOLIC BLOOD PRESSURE: 120 MMHG | HEART RATE: 83 BPM | WEIGHT: 155 LBS | BODY MASS INDEX: 24.91 KG/M2 | DIASTOLIC BLOOD PRESSURE: 74 MMHG | OXYGEN SATURATION: 99 %

## 2019-05-14 DIAGNOSIS — C43.59 MALIGNANT MELANOMA OF BACK (HCC): Primary | ICD-10-CM

## 2019-05-14 PROCEDURE — 99213 OFFICE O/P EST LOW 20 MIN: CPT | Performed by: SURGERY

## 2019-07-30 ENCOUNTER — OFFICE VISIT (OUTPATIENT)
Dept: DERMATOLOGY | Age: 34
End: 2019-07-30
Payer: COMMERCIAL

## 2019-07-30 DIAGNOSIS — Z85.820 HISTORY OF MELANOMA: ICD-10-CM

## 2019-07-30 DIAGNOSIS — D22.9 MULTIPLE BENIGN MELANOCYTIC NEVI: Primary | ICD-10-CM

## 2019-07-30 PROCEDURE — 99213 OFFICE O/P EST LOW 20 MIN: CPT | Performed by: DERMATOLOGY

## 2019-07-30 NOTE — PROGRESS NOTES
Paternal Grandmother     Cancer Paternal Grandfather      Past Medical History:   Diagnosis Date    Migraine      Past Surgical History:   Procedure Laterality Date    [place] EXC SKIN BENIG <5MM TRUNK,ARM,LEG N/A 11/7/2018    EXCISION OF BACK MELANOMA performed by Tank Mcgovern MD at [address] EXTRACTION         No Known Allergies  Outpatient Medications Marked as Taking for the 7/30/19 encounter (Office Visit) with Shan Pandyabridgette, DO   Medication Sig Dispense Refill    Norgestim-Eth Estrad Triphasic (TRI-SPRINTEC PO) Take by mouth daily         Social History:   Social History     Socioeconomic History    Marital status:      Spouse name: Not on file    Number of children: Not on file    Years of education: Not on file    Highest education level: Not on file   Occupational History    Not on file   Social Needs    Financial resource strain: Not on file    Food insecurity:     Worry: Not on file     Inability: Not on file    Transportation needs:     Medical: Not on file     Non-medical: Not on file   Tobacco Use    Smoking status: Never Smoker    Smokeless tobacco: Never Used   Substance and Sexual Activity    Alcohol use: Yes     Comment: Occasionally    Drug use: No    Sexual activity: Yes     Partners: Male   Lifestyle    Physical activity:     Days per week: Not on file     Minutes per session: Not on file    Stress: Not on file   Relationships    Social connections:     Talks on phone: Not on file     Gets together: Not on file     Attends Roman [employer] service: Not on file     Active member of club or organization: Not on file     Attends meetings of clubs or organizations: Not on file     Relationship status: Not on file    Intimate partner violence:     Fear of current or ex partner: Not on file     Emotionally abused: Not on file     Physically abused: Not on file     Forced sexual activity: Not on file   Other Topics Concern    Not on file   Social History

## 2019-10-10 ENCOUNTER — OFFICE VISIT (OUTPATIENT)
Dept: DERMATOLOGY | Age: 34
End: 2019-10-10
Payer: COMMERCIAL

## 2019-10-10 DIAGNOSIS — D22.9 MULTIPLE BENIGN MELANOCYTIC NEVI: Primary | ICD-10-CM

## 2019-10-10 DIAGNOSIS — D23.9 DERMATOFIBROMA: ICD-10-CM

## 2019-10-10 DIAGNOSIS — Z85.820 HISTORY OF MELANOMA: ICD-10-CM

## 2019-10-10 DIAGNOSIS — D23.4 DERMAL NEVUS OF SCALP: ICD-10-CM

## 2019-10-10 PROCEDURE — 99213 OFFICE O/P EST LOW 20 MIN: CPT | Performed by: DERMATOLOGY

## 2019-11-15 ENCOUNTER — OFFICE VISIT (OUTPATIENT)
Dept: SURGERY | Age: 34
End: 2019-11-15
Payer: COMMERCIAL

## 2019-11-15 VITALS
WEIGHT: 162 LBS | DIASTOLIC BLOOD PRESSURE: 77 MMHG | BODY MASS INDEX: 26.03 KG/M2 | HEIGHT: 66 IN | SYSTOLIC BLOOD PRESSURE: 131 MMHG | HEART RATE: 79 BPM | TEMPERATURE: 98.7 F | OXYGEN SATURATION: 98 %

## 2019-11-15 DIAGNOSIS — C43.59 MALIGNANT MELANOMA OF BACK (HCC): Primary | ICD-10-CM

## 2019-11-15 PROCEDURE — 99213 OFFICE O/P EST LOW 20 MIN: CPT | Performed by: SURGERY

## 2020-01-02 LAB
HPV COMMENT: NORMAL
HPV TYPE 16: NOT DETECTED
HPV TYPE 18: NOT DETECTED
HPVOH (OTHER TYPES): NOT DETECTED

## 2020-08-21 ENCOUNTER — OFFICE VISIT (OUTPATIENT)
Dept: DERMATOLOGY | Age: 35
End: 2020-08-21
Payer: COMMERCIAL

## 2020-08-21 VITALS — TEMPERATURE: 97.9 F

## 2020-08-21 PROCEDURE — 99213 OFFICE O/P EST LOW 20 MIN: CPT | Performed by: DERMATOLOGY

## 2020-08-21 NOTE — PROGRESS NOTES
[facility]) Dermatology  [place], [place], [address]       Nora Neal  1985    30 y.o. female     Date of Visit: 2020    Chief Complaint:   Chief Complaint   Patient presents with    Skin Exam     TBSE        I was asked to see this patient by Dr. Odell ref. provider found. History of Present Illness:  Nora Neal is a 30 y.o. female who presents with the chief complaint of the followin. Total body skin cancer screening exam.hx of melanoma (stage T1a), denies recurrence. Today she denies new subcutaneous mass, headache, bone pain, cough, abdominal pain, or suspicious new lesions   2. Many year history of multiple nevi on the head/neck, trunk and extremities, all present for many years. Denies new moles. Denies moles changing in size, shape, color. None associated w/ pain, bleeding, pruritus. Several year history of an asymptomatic dermal nevus on left parietal scalp, patient denies any bleeding, pain, pruritus. Does not believe it has enlarged.    -Admits to sun exposure in youth without wearing sunscreen, hats, or protective clothing.    -Tanning bed use: yes, stopped over 10 years ago  -Occupation: indoor   -Uses sunscreen and/or wears protective clothing: yes when outdoors in warmer months, does not wear a daily moisturizer with sunscreen    Review of Systems:  Constitutional: Reports general sense of well-being   Skin: No new or changing moles, no tendency to develop thick scars, no interval of severe sunburns  Heme: No abnormal bruising or bleeding. Past Medical History, Family History, Surgical History, Medications and Allergies reviewed.     Past Skin Hx:  -2018-Right inferior back- superficial spreading malignant melanoma Stage 1a, Decision dx: class 1a excised with WLE by Dr. Simms Sensin.4mm, no ulceration, 1 mitosis/mm2)  -History of dermatofibroma located to left anterior thigh  Patient denies past history of NMSC, dysplastic nevi, or chronic skin magdiel.     PFHx: Denies hx of MM or NMSC     PSHx:  for girls ([employer])    Family History   Problem Relation Age of Onset    Heart Disease Mother     Arthritis Father     Learning Disabilities Sister     Learning Disabilities Brother     Miscarriages / Stillbirths Maternal Aunt     Cancer Maternal Grandmother     Miscarriages / Stillbirths Maternal Grandmother     Arthritis Paternal Grandmother     Cancer Paternal Grandfather      Past Medical History:   Diagnosis Date    Migraine      Past Surgical History:   Procedure Laterality Date    [place] EXC SKIN BENIG <5MM TRUNK,ARM,LEG N/A 11/7/2018    EXCISION OF BACK MELANOMA performed by Susan Tidwell MD at [address] EXTRACTION         No Known Allergies  Outpatient Medications Marked as Taking for the 8/21/20 encounter (Office Visit) with Charles Mcpherson, DO   Medication Sig Dispense Refill    Norgestim-Eth Estrad Triphasic (TRI-SPRINTEC PO) Take by mouth daily         Social History:   Social History     Socioeconomic History    Marital status:      Spouse name: Not on file    Number of children: Not on file    Years of education: Not on file    Highest education level: Not on file   Occupational History    Not on file   Social Needs    Financial resource strain: Not on file    Food insecurity     Worry: Not on file     Inability: Not on file   [employer] needs     Medical: Not on file     Non-medical: Not on file   Tobacco Use    Smoking status: Never Smoker    Smokeless tobacco: Never Used   Substance and Sexual Activity    Alcohol use: Yes     Comment: Occasionally    Drug use: No    Sexual activity: Yes     Partners: Male   Lifestyle    Physical activity     Days per week: Not on file     Minutes per session: Not on file    Stress: Not on file   Relationships    Social connections     Talks on phone: Not on file     Gets together: Not on file     Attends Latter-day service: Not on file Active member of club or organization: Not on file     Attends meetings of clubs or organizations: Not on file     Relationship status: Not on file    Intimate partner violence     Fear of current or ex partner: Not on file     Emotionally abused: Not on file     Physically abused: Not on file     Forced sexual activity: Not on file   Other Topics Concern    Not on file   Social History Narrative    Not on file       Physical Examination     The following were examined and determined to be normal: Psych/Neuro, Scalp/hair, Head/face, Conjunctivae/eyelids, Gums/teeth/lips, Neck, Breast/axilla/chest, Abdomen, Back, RUE, LUE, RLE, LLE and Nails/digits. buttocks. Areas covered by underwear garment(s) not examined. The following were examined and determined to be abnormal: none. Fink phototype: 2    -Constitutional: Well appearing, no acute distress  -Neurological: Alert and oriented X 3  -Mood and Affect: Pleasant  Total body skin exam performed, areas examined listed above:   -Lymphatics: No palpable lymph nodes to cervical, supraclavicular, axillary, inguinal areas  1. Scattered on the head,neck, trunk and extremities are multiple well-defined round and oval symmetric smoothly-bordered uniformly brown macules and papules. no change in size/shape/color of any lesions; no bleeding lesions. 2. Left inferior temporal scalp-0.5 x 0.4 cm well-circumscribed pink to skin colored uniform papule with central terminal hair  3. Right inferior back- Well healed scar at site of MM, no evidence of recurrent pigmentation, no nodules on palpation. Assessment and Plan     1. Multiple benign melanocytic nevi    2. Dermal nevus of scalp    3. History of melanoma        1.  Multiple benign melanocytic nevi  Benign acquired melanocytic nevi  -Recommend monthly self skin exams   -Educated regarding the ABCDEs of melanoma detection   -Call for any new/changing moles or concerning lesions  -Reviewed sun protective behavior -- sun avoidance during the peak hours of the day, sun-protective clothing (including hat and sunglasses), sunscreen use (water resistant, broad spectrum, SPF at least 30, need for reapplication every 1.5 to 2 hours), avoidance of tanning beds   -Plan: Observation with annual skin checks (earlier if indicated) performed in office to monitor current nevi and to assess for new lesions. 2. Dermal nevus of scalp  -Reassurance, stable  Observation, pt to call if changes in size, shape, color or experiences bleeding/pain/itching      3. History of melanoma  -Well healed scar at site of prior melanoma, no evidence of recurrence  -pt to call if notices any changes in size, shape, color or experiences bleeding/pain/itching of moles  -Recommend follow-up annually with an ophthalmologist.  -understands risk of recurrence, possibility of developing a new melanoma,and the need to monitor skin for changes    RTC 6 months for TBSE        Return to Clinic: 6 month skin exam   Discussed plan with patient and/or primary caretaker. Patient to call clinic with any questions / concerns. Reviewed proper use and side effects of treatment(s) and/or medication(s) with patient and/or primary caretaker. AVS provided or is available on [employer]     Note is transcribed using voice recognition software. Inadvertent computerized transcription errors may be present.

## 2022-07-18 ENCOUNTER — OFFICE VISIT (OUTPATIENT)
Dept: FAMILY MEDICINE CLINIC | Age: 37
End: 2022-07-18
Payer: COMMERCIAL

## 2022-07-18 VITALS
BODY MASS INDEX: 25.39 KG/M2 | DIASTOLIC BLOOD PRESSURE: 72 MMHG | SYSTOLIC BLOOD PRESSURE: 126 MMHG | HEIGHT: 66 IN | HEART RATE: 73 BPM | OXYGEN SATURATION: 98 % | WEIGHT: 158 LBS

## 2022-07-18 DIAGNOSIS — C43.59 MALIGNANT MELANOMA OF BACK (HCC): ICD-10-CM

## 2022-07-18 DIAGNOSIS — Z76.89 ENCOUNTER TO ESTABLISH CARE: ICD-10-CM

## 2022-07-18 DIAGNOSIS — Z00.00 WELL ADULT EXAM: Primary | ICD-10-CM

## 2022-07-18 PROCEDURE — 99385 PREV VISIT NEW AGE 18-39: CPT | Performed by: NURSE PRACTITIONER

## 2022-07-18 SDOH — ECONOMIC STABILITY: TRANSPORTATION INSECURITY
IN THE PAST 12 MONTHS, HAS THE LACK OF TRANSPORTATION KEPT YOU FROM MEDICAL APPOINTMENTS OR FROM GETTING MEDICATIONS?: NO

## 2022-07-18 SDOH — HEALTH STABILITY: PHYSICAL HEALTH: ON AVERAGE, HOW MANY DAYS PER WEEK DO YOU ENGAGE IN MODERATE TO STRENUOUS EXERCISE (LIKE A BRISK WALK)?: 1 DAY

## 2022-07-18 SDOH — HEALTH STABILITY: PHYSICAL HEALTH: ON AVERAGE, HOW MANY MINUTES DO YOU ENGAGE IN EXERCISE AT THIS LEVEL?: 30 MIN

## 2022-07-18 SDOH — ECONOMIC STABILITY: TRANSPORTATION INSECURITY
IN THE PAST 12 MONTHS, HAS LACK OF TRANSPORTATION KEPT YOU FROM MEETINGS, WORK, OR FROM GETTING THINGS NEEDED FOR DAILY LIVING?: NO

## 2022-07-18 SDOH — ECONOMIC STABILITY: FOOD INSECURITY: WITHIN THE PAST 12 MONTHS, THE FOOD YOU BOUGHT JUST DIDN'T LAST AND YOU DIDN'T HAVE MONEY TO GET MORE.: NEVER TRUE

## 2022-07-18 SDOH — ECONOMIC STABILITY: FOOD INSECURITY: WITHIN THE PAST 12 MONTHS, YOU WORRIED THAT YOUR FOOD WOULD RUN OUT BEFORE YOU GOT MONEY TO BUY MORE.: NEVER TRUE

## 2022-07-18 ASSESSMENT — SOCIAL DETERMINANTS OF HEALTH (SDOH)
WITHIN THE LAST YEAR, HAVE TO BEEN RAPED OR FORCED TO HAVE ANY KIND OF SEXUAL ACTIVITY BY YOUR PARTNER OR EX-PARTNER?: NO
HOW HARD IS IT FOR YOU TO PAY FOR THE VERY BASICS LIKE FOOD, HOUSING, MEDICAL CARE, AND HEATING?: NOT HARD AT ALL
WITHIN THE LAST YEAR, HAVE YOU BEEN KICKED, HIT, SLAPPED, OR OTHERWISE PHYSICALLY HURT BY YOUR PARTNER OR EX-PARTNER?: NO
WITHIN THE LAST YEAR, HAVE YOU BEEN AFRAID OF YOUR PARTNER OR EX-PARTNER?: NO
WITHIN THE LAST YEAR, HAVE YOU BEEN HUMILIATED OR EMOTIONALLY ABUSED IN OTHER WAYS BY YOUR PARTNER OR EX-PARTNER?: NO

## 2022-07-18 ASSESSMENT — ENCOUNTER SYMPTOMS
SHORTNESS OF BREATH: 0
ABDOMINAL DISTENTION: 0
BACK PAIN: 0
CONSTIPATION: 0
SINUS PAIN: 0
ABDOMINAL PAIN: 0
DIARRHEA: 0
SINUS PRESSURE: 0
EYE DISCHARGE: 0
COLOR CHANGE: 0
COUGH: 0
NAUSEA: 0
CHEST TIGHTNESS: 0

## 2022-07-18 ASSESSMENT — PATIENT HEALTH QUESTIONNAIRE - PHQ9
SUM OF ALL RESPONSES TO PHQ QUESTIONS 1-9: 0
SUM OF ALL RESPONSES TO PHQ QUESTIONS 1-9: 0
SUM OF ALL RESPONSES TO PHQ9 QUESTIONS 1 & 2: 0
2. FEELING DOWN, DEPRESSED OR HOPELESS: 0
1. LITTLE INTEREST OR PLEASURE IN DOING THINGS: 0
SUM OF ALL RESPONSES TO PHQ QUESTIONS 1-9: 0
SUM OF ALL RESPONSES TO PHQ QUESTIONS 1-9: 0

## 2022-07-18 NOTE — PATIENT INSTRUCTIONS
Follow up in this office in 1 year(s) for further evaluation and treatment of health  See an eye specialist every 1 years  See a dentist every 6 months  You should limit alcohol use to no more than 2 drinks/day (beer included) or abstain completely  Try to get at least 30 minutes of exercise 3-4 days per week  Always wear a seat belt when traveling in a car  Always wear a helmet when riding a bicycle or motorcycle  When exposed to the sun, use a sunscreen that protects against both UVA and UVB radiation with an SPF of 30 or greater- reapply every 2 to 3 hours or after sweating, drying off with a towel, or swimming  You need 3191-5726 mg of calcium and 0756-5125 IU of vitamin D per day- it is possible to meet your calcium requirement with diet alone, but a vitamin D supplement is usually necessary  Have your blood pressure checked at least once every year

## 2022-07-18 NOTE — PROGRESS NOTES
Arthritis Father     Learning Disabilities Sister     Anxiety Disorder Sister     Learning Disabilities Brother     Breast Cancer Maternal Aunt     Miscarriages / Stillbirths Maternal Aunt     Cancer Maternal Grandmother         smoker    Miscarriages / Stillbirths Maternal Grandmother     No Known Problems Maternal Grandfather     Arthritis Paternal Grandmother     Colon Cancer Paternal Grandfather     Cancer Paternal Grandfather         colon vs pancreatic- unsure     Social History     Socioeconomic History    Marital status:      Spouse name: Not on file    Number of children: Not on file    Years of education: Not on file    Highest education level: Not on file   Occupational History    Not on file   Tobacco Use    Smoking status: Never    Smokeless tobacco: Never   Vaping Use    Vaping Use: Never used   Substance and Sexual Activity    Alcohol use: Yes     Alcohol/week: 2.0 standard drinks     Types: 2 Glasses of wine per week     Comment: 1-2 times weekly    Drug use: No    Sexual activity: Yes     Partners: Male   Other Topics Concern    Not on file   Social History Narrative    Not on file     Social Determinants of Health     Financial Resource Strain: Low Risk     Difficulty of Paying Living Expenses: Not hard at all   Food Insecurity: No Food Insecurity    Worried About [address] in the Last Year: Never true    [facility] in the Last Year: Never true   Transportation Needs: No Transportation Needs    Lack of Transportation (Medical): No    Lack of Transportation (Non-Medical):  No   Physical Activity: Insufficiently Active    Days of Exercise per Week: 1 day    Minutes of Exercise per Session: 30 min   Stress: Not on file   Social Connections: Not on file   Intimate Partner Violence: Not At Risk    Fear of Current or Ex-Partner: No    Emotionally Abused: No    Physically Abused: No    Sexually Abused: No   Housing Stability: Not on file       Review of Systems:  Review of Systems Constitutional:  Negative for activity change, appetite change, fatigue, fever and unexpected weight change. HENT:  Negative for congestion, ear pain, sinus pressure and sinus pain. Eyes:  Negative for discharge and visual disturbance. Respiratory:  Negative for cough, chest tightness and shortness of breath. Cardiovascular:  Negative for chest pain, palpitations and leg swelling. Gastrointestinal:  Negative for abdominal distention, abdominal pain, constipation, diarrhea and nausea. Endocrine: Negative for cold intolerance, heat intolerance, polydipsia, polyphagia and polyuria. Genitourinary:  Negative for decreased urine volume, difficulty urinating, dysuria, flank pain, frequency and urgency. Musculoskeletal:  Negative for arthralgias, back pain, gait problem, joint swelling, myalgias and neck pain. Skin:  Negative for color change, rash and wound. Allergic/Immunologic: Negative for food allergies and immunocompromised state. Neurological:  Negative for dizziness, tremors, speech difficulty, weakness, light-headedness, numbness and headaches. Hematological:  Negative for adenopathy. Does not bruise/bleed easily. Psychiatric/Behavioral:  Negative for confusion, decreased concentration, self-injury, sleep disturbance and suicidal ideas. The patient is not nervous/anxious. Physical Exam:   Vitals:    07/18/22 1048   BP: 126/72   Site: Right Upper Arm   Position: Sitting   Cuff Size: Medium Adult   Pulse: 73   SpO2: 98%   Weight: 158 lb (71.7 kg)   Height: 5' 6\" (1.676 m)     Body mass index is 25.5 kg/m². Physical Exam  Vitals reviewed. Constitutional:       General: She is awake. Appearance: Normal appearance. She is well-developed, well-groomed and overweight. She is not ill-appearing. HENT:      Head: Normocephalic and atraumatic.       Right Ear: Hearing, tympanic membrane, ear canal and external ear normal.      Left Ear: Hearing, tympanic membrane, ear canal and external ear normal.      Nose: Nose normal.      Mouth/Throat:      Lips: Pink. Mouth: Mucous membranes are moist.      Pharynx: Oropharynx is clear. Eyes:      General: Lids are normal.      Extraocular Movements: Extraocular movements intact. Conjunctiva/sclera: Conjunctivae normal.      Pupils: Pupils are equal, round, and reactive to light. Neck:      Thyroid: No thyromegaly. Vascular: No carotid bruit. Cardiovascular:      Rate and Rhythm: Normal rate. Pulses:           Carotid pulses are 2+ on the right side and 2+ on the left side. Radial pulses are 2+ on the right side and 2+ on the left side. Posterior tibial pulses are 2+ on the right side and 2+ on the left side. Heart sounds: Normal heart sounds, S1 normal and S2 normal. No murmur heard. Pulmonary:      Effort: Pulmonary effort is normal.      Breath sounds: Normal breath sounds. Chest:   Breasts:     Right: No supraclavicular adenopathy. Left: No supraclavicular adenopathy. Abdominal:      General: Bowel sounds are normal. There is no abdominal bruit. Palpations: Abdomen is soft. Tenderness: There is no abdominal tenderness. Genitourinary:     Comments: Deferred  Musculoskeletal:         General: Normal range of motion. Cervical back: Full passive range of motion without pain, normal range of motion and neck supple. Right lower leg: No edema. Left lower leg: No edema. Lymphadenopathy:      Head:      Right side of head: No submental, submandibular, tonsillar, preauricular, posterior auricular or occipital adenopathy. Left side of head: No submental, submandibular, tonsillar, preauricular, posterior auricular or occipital adenopathy. Cervical: No cervical adenopathy. Right cervical: No superficial, deep or posterior cervical adenopathy. Left cervical: No superficial, deep or posterior cervical adenopathy.       Upper Body:      Right upper body: No supraclavicular adenopathy. Left upper body: No supraclavicular adenopathy. Skin:     General: Skin is warm and dry. Capillary Refill: Capillary refill takes less than 2 seconds. Neurological:      General: No focal deficit present. Mental Status: She is alert and oriented to person, place, and time. Mental status is at baseline. Sensory: Sensation is intact. Motor: Motor function is intact. Coordination: Coordination is intact. Gait: Gait is intact. Psychiatric:         Attention and Perception: Attention and perception normal.         Mood and Affect: Mood and affect normal.         Speech: Speech normal.         Behavior: Behavior normal. Behavior is cooperative. Thought Content:  Thought content normal.         Cognition and Memory: Cognition and memory normal.         Judgment: Judgment normal.        Preventive Care:  Health Maintenance Due   Topic Date Due    DTaP/Tdap/Td vaccine (1 - Tdap) Never done    Diabetes screen  07/22/2021       Hx abnormal PAP: no  Sexual activity: single partner, contraception - OCP (estrogen/progesterone)   Self-breast exams: no, discussed proper technique and frequency  Last eye exam: never,abnormal - recommended   Exercise: no regular exercise, Physical activity 150 minutes weekly recommended   Seatbelt use: Yes       Preventive plan of care for Flaco Thakur        7/18/2022           Preventive Measures Status        Recommendations for screening  Colon Cancer Screen   Last colonoscopy: NA, no fam hx colon cancer  Test is due 40 yo  Breast Cancer Screen  Last mammogram: NA, + fam hx breast cancer maternal aunt in her 46s   Test is due 35 yo  Cervical Cancer Screen   Last PAP smear: 2019, goes yearly to gyn- [address] regularly  Osteoporosis Screen   Last DXA scan: NA  This test is not clinically indicated  Diabetes Screen     Glucose (mg/dL)   Date Value   10/30/2018 75   Cholesterol Screen  No results found for: CHOL, TRIG, HDL, LDLCALC, LDLDIRECT  Recommended, but declined  Aspirin for Cardiovascular Prevention   No  Not indicated  Weight: Body mass index is 25.5 kg/m². 5' 6\" (1.676 m)158 lb (71.7 kg)     Your BMI is 25 or greater, which indicates that you are overweight  Living Will: No    Patient declined    Recommended Immunizations   Immunization History   Administered Date(s) Administered    COVID-19, PFIZER PURPLE top, DILUTE for use, (age 15 y+), 30mcg/0.3mL 02/10/2021, 03/03/2021, 11/16/2021           Other Recommendations   Follow up in this office in 1 year(s) for further evaluation and treatment of health  See an eye specialist every 1 years  See a dentist every 6 months  You should limit alcohol use to no more than 2 drinks/day (beer included) or abstain completely  Try to get at least 30 minutes of exercise 3-4 days per week  Always wear a seat belt when traveling in a car  Always wear a helmet when riding a bicycle or motorcycle  When exposed to the sun, use a sunscreen that protects against both UVA and UVB radiation with an SPF of 30 or greater- reapply every 2 to 3 hours or after sweating, drying off with a towel, or swimming  You need 1093-0641 mg of calcium and 5592-3537 IU of vitamin D per day- it is possible to meet your calcium requirement with diet alone, but a vitamin D supplement is usually necessary  Have your blood pressure checked at least once every year                 Assessment/Plan:    1. Well adult exam   Pt had concerns under right breast but these have resolved prior to the visit   Send for ultrasound if appears again   Follows with derm regularly for hx melanoma  2. Encounter to establish care   Discussed office policies/practices/provider team   Reviewed medical, social, family history and medications   MyChart activation and usage discussed  3.  Malignant melanoma of back [facility])   Oct 2018   Follows with Dr Verner Robert- derm   Next visit tomorrow   Sx Nov 2018- Dr Devi Galindo   Pt had mole removed in highschool/college- grew back through scar tissue, very itchy, was present a long time before going to derm, biopsied at that time      Care Gaps Addressed  Chicken pox as a child  TDAP vaccine recommended- call insurance to discuss coverage- thinks last time about 15 years ago  Diabetes screen recommended- pt declined    I have reviewed patient's pertinent medical history, relevant laboratory and imaging studies, and past/future health maintenance. Discussed with the patient the importance of adhering to their current medication regimen as directed. Advised the patient that they should continue to work on eating a healthy balanced diet and staying active by exercising within their personal limits. Orders as listed above. Patient was advised to keep future appointments with their respective specialty care team(s). Patient had the opportunity to ask questions, all of which were answered to the best of my ability and with patient satisfaction. Patient understands and is agreeable with the care plan following today's visit. Patient is to schedule an appointment for any new or worsening symptoms. Go to ER for significant shortness of breath, chest pain, or uncontrolled pain or fever. I discussed with patient the risk and benefits of any medications that were prescribed today. I verified that the patient understands their medications, labs, and/or procedures. The patient is doing well with current medication regimen and does not have any barriers to adherence. The patient's self-management abilities are good.        Follow Up Complete Annual Physical with Fasting Labs Yearly

## 2022-09-14 ENCOUNTER — TELEPHONE (OUTPATIENT)
Dept: SURGERY | Age: 37
End: 2022-09-14

## 2022-09-14 NOTE — TELEPHONE ENCOUNTER
Jenna from Dr. Humberto West office called to request the melanoma records from the patient's visits with Dr. Alfonzo Andrews. Last seen 11/15/2019. They were having difficulty coordinating with [facility] and wanted to reach out to our office.      Please fax to [phone]    Jenna can be reached at [phone]

## 2023-02-14 ENCOUNTER — OFFICE VISIT (OUTPATIENT)
Dept: FAMILY MEDICINE CLINIC | Age: 38
End: 2023-02-14
Payer: COMMERCIAL

## 2023-02-14 VITALS
HEIGHT: 66 IN | SYSTOLIC BLOOD PRESSURE: 124 MMHG | RESPIRATION RATE: 16 BRPM | BODY MASS INDEX: 25.68 KG/M2 | WEIGHT: 159.8 LBS | HEART RATE: 106 BPM | DIASTOLIC BLOOD PRESSURE: 82 MMHG | OXYGEN SATURATION: 100 %

## 2023-02-14 DIAGNOSIS — J03.01 RECURRENT STREPTOCOCCAL TONSILLITIS: ICD-10-CM

## 2023-02-14 DIAGNOSIS — J02.9 SORE THROAT: Primary | ICD-10-CM

## 2023-02-14 DIAGNOSIS — B37.0 ORAL CANDIDIASIS: ICD-10-CM

## 2023-02-14 LAB — S PYO AG THROAT QL: NORMAL

## 2023-02-14 PROCEDURE — 99213 OFFICE O/P EST LOW 20 MIN: CPT | Performed by: NURSE PRACTITIONER

## 2023-02-14 PROCEDURE — 87880 STREP A ASSAY W/OPTIC: CPT | Performed by: NURSE PRACTITIONER

## 2023-02-14 RX ORDER — CLOTRIMAZOLE 10 MG/1
10 LOZENGE ORAL; TOPICAL
Qty: 50 TABLET | Refills: 0 | Status: SHIPPED | OUTPATIENT
Start: 2023-02-14 | End: 2023-02-24

## 2023-02-14 SDOH — ECONOMIC STABILITY: FOOD INSECURITY: WITHIN THE PAST 12 MONTHS, THE FOOD YOU BOUGHT JUST DIDN'T LAST AND YOU DIDN'T HAVE MONEY TO GET MORE.: NEVER TRUE

## 2023-02-14 SDOH — ECONOMIC STABILITY: HOUSING INSECURITY
IN THE LAST 12 MONTHS, WAS THERE A TIME WHEN YOU DID NOT HAVE A STEADY PLACE TO SLEEP OR SLEPT IN A SHELTER (INCLUDING NOW)?: NO

## 2023-02-14 SDOH — ECONOMIC STABILITY: FOOD INSECURITY: WITHIN THE PAST 12 MONTHS, YOU WORRIED THAT YOUR FOOD WOULD RUN OUT BEFORE YOU GOT MONEY TO BUY MORE.: NEVER TRUE

## 2023-02-14 SDOH — ECONOMIC STABILITY: INCOME INSECURITY: HOW HARD IS IT FOR YOU TO PAY FOR THE VERY BASICS LIKE FOOD, HOUSING, MEDICAL CARE, AND HEATING?: NOT HARD AT ALL

## 2023-02-14 ASSESSMENT — ENCOUNTER SYMPTOMS
SHORTNESS OF BREATH: 0
TROUBLE SWALLOWING: 1
SORE THROAT: 1
ABDOMINAL DISTENTION: 0
CONSTIPATION: 0
VOICE CHANGE: 0
NAUSEA: 0
EYE DISCHARGE: 0
SINUS PAIN: 0
ABDOMINAL PAIN: 0
COUGH: 0
SINUS PRESSURE: 0
CHEST TIGHTNESS: 0
BACK PAIN: 0
COLOR CHANGE: 0
DIARRHEA: 0

## 2023-02-14 ASSESSMENT — PATIENT HEALTH QUESTIONNAIRE - PHQ9
SUM OF ALL RESPONSES TO PHQ QUESTIONS 1-9: 1
2. FEELING DOWN, DEPRESSED OR HOPELESS: 1
1. LITTLE INTEREST OR PLEASURE IN DOING THINGS: 0
SUM OF ALL RESPONSES TO PHQ QUESTIONS 1-9: 1
SUM OF ALL RESPONSES TO PHQ9 QUESTIONS 1 & 2: 1

## 2023-02-14 NOTE — PROGRESS NOTES
Date of Service:  2023    Jose Bustamante (:  1985) is a 40 y.o. female, here for evaluation of the following medical concerns:    Chief Complaint   Patient presents with    Other     Throat pain x3 wks, strep pos 3 times        HPI    Sore Throat  Patient complains of sore throat. Associated symptoms include sore throat and one localized pain at the back spot of throat only when swallowing, no fevers, feels like a scratch in that one spot when she swallows, feels an odd area in the posterior area of her tongue even with yawning. Never got strep when she was younger. Works at [employer] . Onset of symptoms was 6 weeks ago, unchanged since that time. She is drinking plenty of fluids. She does not have had recent close exposure to someone with proven streptococcal pharyngitis. Patient has had strep 4 times since 2022, 3 times being since January 3, 2023. Sometimes wakes up feeling OK and then feels it as the day progresses. Not a smoker. No change in voice. Always completed antibiotic courses in their entirety. Reviewed treatment courses:  September: amoxicillin 500 mg BID x 10 days  January 3: amoxicillin 875 mg BID x 10 days  : azithromycin Z pack x 5 days  : clindamycin 300 mg TID x 10 days        Review of Systems   Constitutional:  Negative for activity change, appetite change, fatigue, fever and unexpected weight change. HENT:  Positive for sore throat and trouble swallowing. Negative for congestion, ear pain, sinus pressure, sinus pain and voice change. Eyes:  Negative for discharge and visual disturbance. Respiratory:  Negative for cough, chest tightness and shortness of breath. Cardiovascular:  Negative for chest pain, palpitations and leg swelling. Gastrointestinal:  Negative for abdominal distention, abdominal pain, constipation, diarrhea and nausea.    Endocrine: Negative for cold intolerance, heat intolerance, polydipsia, polyphagia and polyuria. Genitourinary:  Negative for decreased urine volume, difficulty urinating, dysuria, flank pain, frequency and urgency. Musculoskeletal:  Negative for arthralgias, back pain, gait problem, joint swelling, myalgias and neck pain. Skin:  Negative for color change, rash and wound. Allergic/Immunologic: Negative for food allergies and immunocompromised state. Neurological:  Negative for dizziness, tremors, speech difficulty, weakness, light-headedness, numbness and headaches. Hematological:  Negative for adenopathy. Does not bruise/bleed easily. Psychiatric/Behavioral:  Negative for confusion, decreased concentration, self-injury, sleep disturbance and suicidal ideas. The patient is not nervous/anxious. Prior to Visit Medications    Medication Sig Taking? Authorizing Provider   clotrimazole (MYCELEX) 10 MG elizabeth Take 1 tablet by mouth 5 times daily for 10 days Yes Brown Player, APRN - CNP   Norgestim-Eth Ulysses Cornejo Triphasic (TRI-SPRINTEC PO) Take by mouth daily Yes Historical Provider, MD        Social History     Tobacco Use    Smoking status: Never    Smokeless tobacco: Never   Substance Use Topics    Alcohol use: Yes     Alcohol/week: 2.0 standard drinks     Types: 2 Glasses of wine per week     Comment: 1-2 times weekly        Vitals:    02/14/23 1344   BP: 124/82   Site: Left Upper Arm   Position: Sitting   Cuff Size: Medium Adult   Pulse: (!) 106   Resp: 16   SpO2: 100%   Weight: 159 lb 12.8 oz (72.5 kg)   Height: 5' 6\" (1.676 m)     Estimated body mass index is 25.79 kg/m² as calculated from the following:    Height as of this encounter: 5' 6\" (1.676 m). Weight as of this encounter: 159 lb 12.8 oz (72.5 kg). Physical Exam  Vitals reviewed. Constitutional:       General: She is awake. Appearance: Normal appearance. She is well-developed, well-groomed and overweight. She is not ill-appearing. HENT:      Head: Normocephalic and atraumatic.       Right Ear: Hearing, tympanic membrane, ear canal and external ear normal.      Left Ear: Hearing, tympanic membrane, ear canal and external ear normal.      Nose: Nose normal.      Mouth/Throat:      Lips: Pink. Mouth: Mucous membranes are moist.      Pharynx: Oropharynx is clear. Tonsils: 0 on the right. 0 on the left. Comments: Tonsils not visible   Eyes:      General: Lids are normal.      Extraocular Movements: Extraocular movements intact. Conjunctiva/sclera: Conjunctivae normal.      Pupils: Pupils are equal, round, and reactive to light. Neck:      Thyroid: No thyromegaly. Vascular: No carotid bruit. Cardiovascular:      Rate and Rhythm: Normal rate. Pulses:           Carotid pulses are 2+ on the right side and 2+ on the left side. Radial pulses are 2+ on the right side and 2+ on the left side. Posterior tibial pulses are 2+ on the right side and 2+ on the left side. Heart sounds: Normal heart sounds, S1 normal and S2 normal. No murmur heard. Pulmonary:      Effort: Pulmonary effort is normal.      Breath sounds: Normal breath sounds. Abdominal:      General: Bowel sounds are normal. There is no abdominal bruit. Palpations: Abdomen is soft. Tenderness: There is no abdominal tenderness. Genitourinary:     Comments: Deferred  Musculoskeletal:         General: Normal range of motion. Cervical back: Full passive range of motion without pain, normal range of motion and neck supple. Right lower leg: No edema. Left lower leg: No edema. Lymphadenopathy:      Head:      Right side of head: Tonsillar adenopathy present. No submental, submandibular, preauricular, posterior auricular or occipital adenopathy. Left side of head: Tonsillar adenopathy present. No submental, submandibular, preauricular, posterior auricular or occipital adenopathy. Cervical: Cervical adenopathy present. Right cervical: Superficial cervical adenopathy present.  No deep or posterior cervical adenopathy. Left cervical: Superficial cervical adenopathy present. No deep or posterior cervical adenopathy. Upper Body:      Right upper body: No supraclavicular adenopathy. Left upper body: No supraclavicular adenopathy. Skin:     General: Skin is warm and dry. Capillary Refill: Capillary refill takes less than 2 seconds. Neurological:      General: No focal deficit present. Mental Status: She is alert and oriented to person, place, and time. Mental status is at baseline. Sensory: Sensation is intact. Motor: Motor function is intact. Coordination: Coordination is intact. Gait: Gait is intact. Psychiatric:         Attention and Perception: Attention and perception normal.         Mood and Affect: Mood and affect normal.         Speech: Speech normal.         Behavior: Behavior normal. Behavior is cooperative. Thought Content: Thought content normal.         Cognition and Memory: Cognition and memory normal.         Judgment: Judgment normal.       ASSESSMENT/PLAN:  1. Sore throat  -     [address], Yong Barreto DO, [facility]  -     POCT rapid strep A  -     clotrimazole (MYCELEX) 10 MG elizabeth; Take 1 tablet by mouth 5 times daily for 10 days, Disp-50 tablet, R-0Normal   Negative strep today  2. Oral candidiasis  -     clotrimazole (MYCELEX) 10 MG elizabeth; Take 1 tablet by mouth 5 times daily for 10 days, Disp-50 tablet, R-0Normal   Treat for possible thrush, pain is deep   If continues, see ENT  3. Recurrent streptococcal tonsillitis  -     [address], Yong Barreto DO, [facility]    Recurrent strep pharyngitis, minimal tonsils   It is the patient's responsibility to call to schedule the referral/set up the appointment.  We discussed this, and the patient was given the information on paper along with their AVS to contact the provider for the referral; the provider information was highlighted/circled for convenience. Care Gaps Addressed  TDAP vaccine recommended- call insurance to discuss coverage  Diabetes screen due  COVID booster recommended  Flu vaccine recommended       I have reviewed patient's pertinent medical history, relevant laboratory and imaging studies, and past/future health maintenance. Discussed with the patient the importance of adhering to their current medication regimen as directed. Advised the patient that they should continue to work on eating a healthy balanced diet and staying active by exercising within their personal limits. Orders as listed above. Patient was advised to keep future appointments with their respective specialty care team(s). Patient had the opportunity to ask questions, all of which were answered to the best of my ability and with patient satisfaction. Patient understands and is agreeable with the care plan following today's visit. Patient is to schedule an appointment for any new or worsening symptoms. Go to ER for significant shortness of breath, chest pain, or uncontrolled pain or fever. I discussed with patient the risk and benefits of any medications that were prescribed today. I verified that the patient understands their medications, labs, and/or procedures. The patient is doing well with current medication regimen and does not have any barriers to adherence. The patient's self-management abilities are good. Return in about 5 months (around 7/19/2023) for Physical Exam and Fasting Labs. An electronic signature was used to authenticate this note.     --LEN Valdez - CNP on 2/14/2023 at 2:24 PM

## 2023-02-20 ENCOUNTER — OFFICE VISIT (OUTPATIENT)
Dept: ENT CLINIC | Age: 38
End: 2023-02-20
Payer: COMMERCIAL

## 2023-02-20 VITALS
OXYGEN SATURATION: 98 % | SYSTOLIC BLOOD PRESSURE: 119 MMHG | HEART RATE: 78 BPM | BODY MASS INDEX: 26.03 KG/M2 | HEIGHT: 66 IN | DIASTOLIC BLOOD PRESSURE: 83 MMHG | WEIGHT: 162 LBS | TEMPERATURE: 97.1 F

## 2023-02-20 DIAGNOSIS — R09.89 GLOBUS PHARYNGEUS: Primary | ICD-10-CM

## 2023-02-20 DIAGNOSIS — K21.9 LARYNGOPHARYNGEAL REFLUX (LPR): ICD-10-CM

## 2023-02-20 DIAGNOSIS — J30.9 ALLERGIC RHINITIS, UNSPECIFIED SEASONALITY, UNSPECIFIED TRIGGER: ICD-10-CM

## 2023-02-20 PROCEDURE — 99204 OFFICE O/P NEW MOD 45 MIN: CPT | Performed by: STUDENT IN AN ORGANIZED HEALTH CARE EDUCATION/TRAINING PROGRAM

## 2023-02-20 PROCEDURE — 31575 DIAGNOSTIC LARYNGOSCOPY: CPT | Performed by: STUDENT IN AN ORGANIZED HEALTH CARE EDUCATION/TRAINING PROGRAM

## 2023-02-20 RX ORDER — PANTOPRAZOLE SODIUM 40 MG/1
40 TABLET, DELAYED RELEASE ORAL
Qty: 90 TABLET | Refills: 0 | Status: SHIPPED | OUTPATIENT
Start: 2023-02-20

## 2023-02-20 RX ORDER — FLUTICASONE PROPIONATE 50 MCG
2 SPRAY, SUSPENSION (ML) NASAL DAILY
Qty: 48 G | Refills: 1 | Status: SHIPPED | OUTPATIENT
Start: 2023-02-20

## 2023-02-20 NOTE — PATIENT INSTRUCTIONS
-Take 40 mg Protonix (pantoprazole) in morning DAILY 1 hour before breakfast.  -Start conservative management lifestyle modification strategies for reflux treatment including:      -Avoidance of late night eating and lying down soon after eating. Consider lying down and sleeping in a reclined position or even raising the head of the bed up a little bit as to reduce the gravity effects of acid reflux.        -Avoidance of trigger foods that could worsen reflux including alcohol, excessively salty foods, spicy foods, acidic foods, chocolate, peppermint, fatty foods, coffee.

## 2023-02-20 NOTE — PROGRESS NOTES
[facility] [facility]  NEW PATIENT HISTORY AND PHYSICAL NOTE      Patient Name: Jenelle Wright  Medical Record Number:  [id number]  Primary Care Physician:  LEN Diez CNP    ChiefComplaint     Chief Complaint   Patient presents with    Other     Throat pain, feels like a lump in the throat, hurst more in the evening than in the morning, has strep 3x in a month        History of Present Illness     Jenelle Wright is an 40 y.o. female presenting with throat issues. After giancarlo had a sinus infection with strep throat. Tested positive for strep throat 3x in janurary. Has been placed on Amoxil, Z-shena, then clindamycin. Feels like she has something stuck in the back of her throat on the back of her tongue. Will occasionally be painful like a sore or a scratch, sometimes nonpainful. Antibiotics never helped this sensation. Worse in evening time. No hx of reflux or heartburn, no reflux meds. No significant allergy issues or nasal sprays. Currently on Mycelex troches which may be helping only a little bit. Woke up Wednesday night with bad throat pain. + throat clearing. Will not typically lie down soon after eating. Never smoker. Social ETOH use. No recent voice changes. No history of throat, chest, neck surgery.       Past Medical History     Past Medical History:   Diagnosis Date    Migraine        Past Surgical History     Past Surgical History:   Procedure Laterality Date    NC EXC B9 LESION MRGN XCP SK TG T/A/L 0.5 CM/< N/A 11/7/2018    EXCISION OF BACK MELANOMA performed by Herson Hanson MD at [address] EXTRACTION         Family History     Family History   Problem Relation Age of Onset    Heart Disease Mother     Arthritis Father     Learning Disabilities Sister     Anxiety Disorder Sister     Learning Disabilities Brother     Breast Cancer Maternal Aunt     Miscarriages / Stillbirths Maternal Aunt     Cancer Maternal Grandmother smoker    Miscarriages / Stillbirths Maternal Grandmother     No Known Problems Maternal Grandfather     Arthritis Paternal Grandmother     Colon Cancer Paternal Grandfather     Cancer Paternal Grandfather         colon vs pancreatic- unsure       Social History     Social History     Tobacco Use    Smoking status: Never    Smokeless tobacco: Never   Vaping Use    Vaping Use: Never used   Substance Use Topics    Alcohol use: Yes     Alcohol/week: 2.0 standard drinks     Types: 2 Glasses of wine per week     Comment: 1-2 times weekly    Drug use: No        Allergies     No Known Allergies    Medications     Current Outpatient Medications   Medication Sig Dispense Refill    fluticasone (FLONASE) 50 MCG/ACT nasal spray 2 sprays by Each Nostril route daily 48 g 1    pantoprazole (PROTONIX) 40 MG tablet Take 1 tablet by mouth every morning (before breakfast) 90 tablet 0    clotrimazole (MYCELEX) 10 MG elizabeth Take 1 tablet by mouth 5 times daily for 10 days 50 tablet 0    Norgestim-Eth Estrad Triphasic (TRI-SPRINTEC PO) Take by mouth daily       No current facility-administered medications for this visit. Review of Systems     REVIEW OF SYSTEMS    See HPI Above    PhysicalExam     Vitals:    02/20/23 0932   BP: 119/83   Pulse: 78   Temp: 97.1 °F (36.2 °C)   TempSrc: Temporal   SpO2: 98%   Weight: 162 lb (73.5 kg)   Height: 5' 6\" (1.676 m)       PHYSICAL EXAM  /83   Pulse 78   Temp 97.1 °F (36.2 °C) (Temporal)   Ht 5' 6\" (1.676 m)   Wt 162 lb (73.5 kg)   SpO2 98%   BMI 26.15 kg/m²     GENERAL: No acute distress, alert and oriented. No hoarseness  EYES: EOMI, Anti-icteric  NOSE: On anterior rhinoscopy there is no epistaxis, nasal mucosa moist and normal appearing, no purulent drainage.    EARS: Normal external appearance; on portable otomicroscopy:     -Ad: External auditory canal without stenosis, tympanic membrane clear, no middle ear effusions or retractions.      -As: External auditory canal without stenosis, tympanic membrane clear, no middle ear effusions or retractions. Pneumatic otoscopy: Bilateral tympanic membranes mobile pneumatic otoscopy  FACE: HB 1/6 bilaterally, symmetric appearing, sensation equal bilaterally  ORAL CAVITY: + OP cobblestoning. Tonsils 1+ without edema or erythema. No masses or lesions visualized or palpated, uvula is midline, moist mucous membranes, no oropharyngeal masses or oropharyngeal obstruction  NECK: Normal range of motion, no thyromegaly, trachea is midline, no palpable lymphadenopathy or neck masses, no crepitus  NEURO: Cranial Nerves 2, 3, 4, 5, 6, 7, 11, 12 grossly intact bilaterally     I have performed a head and neck physical exam personally or was physically present during the key or critical portions of the service. Procedure     Procedure: Flexible Laryngoscopy    Pre-op: Globus pharyngeus  Post-op: Same  Procedure : Flexible Nasopharyngolaryngoscopy  Surgeon: Dr. Kenzie Garcia,   Anesthesia: Afrin with 2% lidocaine  Estimated Blood Loss: None    Description of Procedure:  After obtaining consent, the patient was placed in the examination chair in the upright position. Decongestant and topical anesthetic was sprayed in the bilateral nares and after allowing adequate time for hemostatic effect, the flexible 4 mm laryngoscope was passed via the right nasal passage.     Nasal Septum: No acute septal deviation, no septal hematoma or perforations noted   Nasal Findings: No active hemorrhage, no nasal masses appreciated   Nasopharynx: Clear, no masses or inflammation  Oropharynx: No exophytic or ulcerative lesions, mucosa appears within normal limits  Base of Tongue: Lingual tonsils within normal limits, vallecula without effacement  Epiglottis: Upright, in normal anatomical position  True Vocal Cord: Anatomically normal, no masses or inflammation, normal abduction and adduction upon inspiration and phonation  False Vocal Cord: normal appearing without masses  Hypopharynx Mucosa: + Moderate postcricoid area edema  Arytenoids: Normal mucosa, no dislocation appreciated     * Patient tolerated the procedure well with no complications   * Patient was instructed not to eat for 30 minutes following procedure. * Patient was instructed that they may notice minor bleeding. Assessment and Plan     1. Globus pharyngeus  -Flexible laryngoscopy without neoplastic or ulcerative lesions, relative within normal limits with some mild postcricoid edema. Symptoms may be related to recent tonsillitis although there is no evidence of tonsillitis on examination today. No evidence of oral or oral pharyngeal fungal infection, she can stop the Mycelex atrocious as a are not seeming to help. Suspect symptoms likely related to underlying LPR and possible allergy component. 2. Laryngopharyngeal reflux (LPR)  -Take 40 mg Protonix in morning DAILY 1 hour before breakfast.  We will place her on a 3-month therapeutic trial of this. If her symptoms are not resolved or getting worse after 3-month trial she will call to schedule a follow-up  -Discussed conservative management lifestyle modification strategies for reflux treatment including:      -Avoidance of late night eating and lying down soon after eating. Consider lying down and sleeping in a reclined position as to reduce the gravity effects of acid reflux.        -Avoidance of trigger foods that could worsen reflux including alcohol, excessively salty foods, spicy foods, acidic foods, chocolate, peppermint, fatty foods, coffee. 3. Allergic rhinitis, unspecified seasonality, unspecified trigger  -Start Flonase daily      Follow Up     Return in about 3 months (around 5/20/2023). Michelle Sabillon   Department of Otolaryngology/Head & Neck Surgery  2/20/23    Medical Decision Making:   The following items were considered in medical decision making:  Independent review of images  Review / order clinical lab tests  Review / order radiology tests  Decision to obtain old records    This note was generated completely or in part utilizing Dragon dictation speech recognition software. Occasionally, words are mistranscribed and despite editing, the text may contain inaccuracies due to incorrect word recognition. If further clarification is needed please contact the office at [phone].

## 2023-05-22 NOTE — TELEPHONE ENCOUNTER
Rx refill request    Pantoprazole (PROTONIX) 40MG tablet, Take one tablet daily before breakfast    Prescribed on 2/20/2023 90 tablets with 0 refills    Last OV 2/20/2023    Please advise thank you.

## 2023-05-25 RX ORDER — PANTOPRAZOLE SODIUM 40 MG/1
TABLET, DELAYED RELEASE ORAL
Qty: 90 TABLET | Refills: 0 | Status: SHIPPED | OUTPATIENT
Start: 2023-05-25

## 2023-06-04 ENCOUNTER — E-VISIT (OUTPATIENT)
Dept: FAMILY MEDICINE CLINIC | Age: 38
End: 2023-06-04
Payer: COMMERCIAL

## 2023-06-04 DIAGNOSIS — R30.0 DYSURIA: Primary | ICD-10-CM

## 2023-06-04 PROCEDURE — 99422 OL DIG E/M SVC 11-20 MIN: CPT | Performed by: NURSE PRACTITIONER

## 2023-06-05 RX ORDER — NITROFURANTOIN 25; 75 MG/1; MG/1
100 CAPSULE ORAL 2 TIMES DAILY
Qty: 10 CAPSULE | Refills: 0 | Status: SHIPPED | OUTPATIENT
Start: 2023-06-05 | End: 2023-06-10

## 2023-06-05 RX ORDER — PHENAZOPYRIDINE HYDROCHLORIDE 100 MG/1
100 TABLET, FILM COATED ORAL 3 TIMES DAILY PRN
Qty: 12 TABLET | Refills: 0 | Status: SHIPPED | OUTPATIENT
Start: 2023-06-05 | End: 2024-06-04

## 2023-08-22 RX ORDER — PANTOPRAZOLE SODIUM 40 MG/1
TABLET, DELAYED RELEASE ORAL
Qty: 90 TABLET | Refills: 0 | Status: SHIPPED | OUTPATIENT
Start: 2023-08-22

## 2023-08-22 NOTE — TELEPHONE ENCOUNTER
Rx refill request  Pantoprazole 40mg QD  Prescribed on 5/25/2023 90 tablets with 0 refills  Last OV 2/20/2023    Please advise thank you

## 2023-09-29 ENCOUNTER — OFFICE VISIT (OUTPATIENT)
Dept: FAMILY MEDICINE CLINIC | Age: 38
End: 2023-09-29

## 2023-09-29 VITALS
HEART RATE: 91 BPM | BODY MASS INDEX: 25.88 KG/M2 | DIASTOLIC BLOOD PRESSURE: 72 MMHG | WEIGHT: 161 LBS | HEIGHT: 66 IN | OXYGEN SATURATION: 99 % | SYSTOLIC BLOOD PRESSURE: 110 MMHG

## 2023-09-29 DIAGNOSIS — Z00.00 WELL ADULT EXAM: Primary | ICD-10-CM

## 2023-09-29 DIAGNOSIS — Z76.89 ENCOUNTER TO ESTABLISH CARE: ICD-10-CM

## 2023-09-29 DIAGNOSIS — J30.2 SEASONAL ALLERGIES: ICD-10-CM

## 2023-09-29 DIAGNOSIS — F41.9 ANXIETY: ICD-10-CM

## 2023-09-29 LAB
ANION GAP SERPL CALCULATED.3IONS-SCNC: 12 MMOL/L (ref 3–16)
BUN SERPL-MCNC: 13 MG/DL (ref 7–20)
CALCIUM SERPL-MCNC: 9.5 MG/DL (ref 8.3–10.6)
CHLORIDE SERPL-SCNC: 102 MMOL/L (ref 99–110)
CHOLEST SERPL-MCNC: 193 MG/DL (ref 0–199)
CO2 SERPL-SCNC: 26 MMOL/L (ref 21–32)
CREAT SERPL-MCNC: 0.8 MG/DL (ref 0.6–1.1)
GFR SERPLBLD CREATININE-BSD FMLA CKD-EPI: >60 ML/MIN/{1.73_M2}
GLUCOSE SERPL-MCNC: 124 MG/DL (ref 70–99)
HDLC SERPL-MCNC: 78 MG/DL (ref 40–60)
LDLC SERPL CALC-MCNC: 98 MG/DL
POTASSIUM SERPL-SCNC: 4.1 MMOL/L (ref 3.5–5.1)
SODIUM SERPL-SCNC: 140 MMOL/L (ref 136–145)
TRIGL SERPL-MCNC: 86 MG/DL (ref 0–150)
VLDLC SERPL CALC-MCNC: 17 MG/DL

## 2023-09-29 RX ORDER — ALPRAZOLAM 0.5 MG/1
0.5 TABLET ORAL 3 TIMES DAILY PRN
Qty: 10 TABLET | Refills: 0 | Status: SHIPPED | OUTPATIENT
Start: 2023-09-29 | End: 2023-10-29

## 2023-09-29 SDOH — HEALTH STABILITY: PHYSICAL HEALTH: ON AVERAGE, HOW MANY DAYS PER WEEK DO YOU ENGAGE IN MODERATE TO STRENUOUS EXERCISE (LIKE A BRISK WALK)?: 2 DAYS

## 2023-09-29 SDOH — HEALTH STABILITY: PHYSICAL HEALTH: ON AVERAGE, HOW MANY MINUTES DO YOU ENGAGE IN EXERCISE AT THIS LEVEL?: 30 MIN

## 2023-09-29 ASSESSMENT — ENCOUNTER SYMPTOMS
SINUS PAIN: 0
RESPIRATORY NEGATIVE: 1
VOMITING: 0
TROUBLE SWALLOWING: 0
ABDOMINAL PAIN: 0
CONSTIPATION: 0
SINUS PRESSURE: 0
COLOR CHANGE: 0
DIARRHEA: 0
EYES NEGATIVE: 1
RHINORRHEA: 0
BACK PAIN: 0
ALLERGIC/IMMUNOLOGIC NEGATIVE: 1
NAUSEA: 0
SORE THROAT: 0

## 2023-09-29 ASSESSMENT — PATIENT HEALTH QUESTIONNAIRE - PHQ9
1. LITTLE INTEREST OR PLEASURE IN DOING THINGS: 0
SUM OF ALL RESPONSES TO PHQ QUESTIONS 1-9: 0
SUM OF ALL RESPONSES TO PHQ QUESTIONS 1-9: 0
2. FEELING DOWN, DEPRESSED OR HOPELESS: 0
SUM OF ALL RESPONSES TO PHQ QUESTIONS 1-9: 0
SUM OF ALL RESPONSES TO PHQ QUESTIONS 1-9: 0
SUM OF ALL RESPONSES TO PHQ9 QUESTIONS 1 & 2: 0

## 2023-09-29 NOTE — PATIENT INSTRUCTIONS
Add daily zyrtec D or claritin for allergies   Delsym or robitussin for cough  Short dose of xanax for any anxiety symptoms

## 2023-11-28 RX ORDER — PANTOPRAZOLE SODIUM 40 MG/1
TABLET, DELAYED RELEASE ORAL
Qty: 90 TABLET | Refills: 0 | OUTPATIENT
Start: 2023-11-28

## 2025-01-25 ASSESSMENT — PATIENT HEALTH QUESTIONNAIRE - PHQ9
1. LITTLE INTEREST OR PLEASURE IN DOING THINGS: NOT AT ALL
2. FEELING DOWN, DEPRESSED OR HOPELESS: NOT AT ALL
2. FEELING DOWN, DEPRESSED OR HOPELESS: NOT AT ALL
SUM OF ALL RESPONSES TO PHQ QUESTIONS 1-9: 0
SUM OF ALL RESPONSES TO PHQ QUESTIONS 1-9: 0
SUM OF ALL RESPONSES TO PHQ9 QUESTIONS 1 & 2: 0
1. LITTLE INTEREST OR PLEASURE IN DOING THINGS: NOT AT ALL
SUM OF ALL RESPONSES TO PHQ QUESTIONS 1-9: 0
SUM OF ALL RESPONSES TO PHQ9 QUESTIONS 1 & 2: 0
SUM OF ALL RESPONSES TO PHQ QUESTIONS 1-9: 0

## 2025-01-28 ENCOUNTER — OFFICE VISIT (OUTPATIENT)
Dept: FAMILY MEDICINE CLINIC | Age: 40
End: 2025-01-28
Payer: COMMERCIAL

## 2025-01-28 VITALS
DIASTOLIC BLOOD PRESSURE: 78 MMHG | WEIGHT: 169.6 LBS | SYSTOLIC BLOOD PRESSURE: 114 MMHG | OXYGEN SATURATION: 96 % | HEIGHT: 66 IN | RESPIRATION RATE: 16 BRPM | HEART RATE: 84 BPM | BODY MASS INDEX: 27.26 KG/M2

## 2025-01-28 DIAGNOSIS — R53.83 OTHER FATIGUE: ICD-10-CM

## 2025-01-28 DIAGNOSIS — C43.59 MALIGNANT MELANOMA OF BACK (HCC): ICD-10-CM

## 2025-01-28 DIAGNOSIS — Z00.00 ENCOUNTER FOR WELL ADULT EXAM WITHOUT ABNORMAL FINDINGS: Primary | ICD-10-CM

## 2025-01-28 DIAGNOSIS — R73.03 PREDIABETES: ICD-10-CM

## 2025-01-28 DIAGNOSIS — Z13.220 SCREENING, LIPID: ICD-10-CM

## 2025-01-28 LAB
ALBUMIN SERPL-MCNC: 4.7 G/DL (ref 3.4–5)
ALBUMIN/GLOB SERPL: 1.6 {RATIO} (ref 1.1–2.2)
ALP SERPL-CCNC: 53 U/L (ref 40–129)
ALT SERPL-CCNC: 18 U/L (ref 10–40)
ANION GAP SERPL CALCULATED.3IONS-SCNC: 8 MMOL/L (ref 3–16)
AST SERPL-CCNC: 20 U/L (ref 15–37)
BASOPHILS # BLD: 0 K/UL (ref 0–0.2)
BASOPHILS NFR BLD: 0.5 %
BILIRUB SERPL-MCNC: 0.4 MG/DL (ref 0–1)
BUN SERPL-MCNC: 11 MG/DL (ref 7–20)
CALCIUM SERPL-MCNC: 10.1 MG/DL (ref 8.3–10.6)
CHLORIDE SERPL-SCNC: 102 MMOL/L (ref 99–110)
CHOLEST SERPL-MCNC: 212 MG/DL (ref 0–199)
CO2 SERPL-SCNC: 29 MMOL/L (ref 21–32)
CREAT SERPL-MCNC: 0.8 MG/DL (ref 0.6–1.1)
DEPRECATED RDW RBC AUTO: 13.6 % (ref 12.4–15.4)
EOSINOPHIL # BLD: 0 K/UL (ref 0–0.6)
EOSINOPHIL NFR BLD: 0.7 %
GFR SERPLBLD CREATININE-BSD FMLA CKD-EPI: >90 ML/MIN/{1.73_M2}
GLUCOSE SERPL-MCNC: 101 MG/DL (ref 70–99)
HCT VFR BLD AUTO: 45 % (ref 36–48)
HDLC SERPL-MCNC: 89 MG/DL (ref 40–60)
HGB BLD-MCNC: 14.9 G/DL (ref 12–16)
LDLC SERPL CALC-MCNC: 105 MG/DL
LYMPHOCYTES # BLD: 1.8 K/UL (ref 1–5.1)
LYMPHOCYTES NFR BLD: 28.2 %
MCH RBC QN AUTO: 30.7 PG (ref 26–34)
MCHC RBC AUTO-ENTMCNC: 33.2 G/DL (ref 31–36)
MCV RBC AUTO: 92.4 FL (ref 80–100)
MONOCYTES # BLD: 0.4 K/UL (ref 0–1.3)
MONOCYTES NFR BLD: 6.2 %
NEUTROPHILS # BLD: 4.2 K/UL (ref 1.7–7.7)
NEUTROPHILS NFR BLD: 64.4 %
PLATELET # BLD AUTO: 239 K/UL (ref 135–450)
PMV BLD AUTO: 9.1 FL (ref 5–10.5)
POTASSIUM SERPL-SCNC: 4.9 MMOL/L (ref 3.5–5.1)
PROT SERPL-MCNC: 7.6 G/DL (ref 6.4–8.2)
RBC # BLD AUTO: 4.86 M/UL (ref 4–5.2)
SODIUM SERPL-SCNC: 139 MMOL/L (ref 136–145)
TRIGL SERPL-MCNC: 89 MG/DL (ref 0–150)
TSH SERPL DL<=0.005 MIU/L-ACNC: 2.37 UIU/ML (ref 0.27–4.2)
VLDLC SERPL CALC-MCNC: 18 MG/DL
WBC # BLD AUTO: 6.5 K/UL (ref 4–11)

## 2025-01-28 PROCEDURE — 99395 PREV VISIT EST AGE 18-39: CPT | Performed by: NURSE PRACTITIONER

## 2025-01-28 SDOH — ECONOMIC STABILITY: FOOD INSECURITY: WITHIN THE PAST 12 MONTHS, THE FOOD YOU BOUGHT JUST DIDN'T LAST AND YOU DIDN'T HAVE MONEY TO GET MORE.: NEVER TRUE

## 2025-01-28 SDOH — ECONOMIC STABILITY: FOOD INSECURITY: WITHIN THE PAST 12 MONTHS, YOU WORRIED THAT YOUR FOOD WOULD RUN OUT BEFORE YOU GOT MONEY TO BUY MORE.: NEVER TRUE

## 2025-01-28 ASSESSMENT — ENCOUNTER SYMPTOMS
CONSTIPATION: 0
DIARRHEA: 0
COLOR CHANGE: 0
RHINORRHEA: 0
SINUS PRESSURE: 0
VOMITING: 0
TROUBLE SWALLOWING: 0
ABDOMINAL PAIN: 0
SINUS PAIN: 0
NAUSEA: 0
BACK PAIN: 0
SORE THROAT: 0
RESPIRATORY NEGATIVE: 1
EYES NEGATIVE: 1
ALLERGIC/IMMUNOLOGIC NEGATIVE: 1

## 2025-01-28 NOTE — PROGRESS NOTES
Well Adult Note  Name: Elizabeth Martinez Today’s Date: 2025   MRN: [id number] Sex: Female   Age: 39 y.o. Ethnicity: Non- / Non    : 1985 Race: White (non-)      Elizabeth Martinez is here for a well adult exam.       Assessment & Plan   Encounter for well adult exam without abnormal findings  - care gaps addressed, labs drawn, exam done, medications and concerns addressed  Malignant melanoma of back (HCC)  -     [facility] - Aura Salmon PA, Dermatology, [facility]  Screening, lipid  -     LIPID PANEL  -     Comprehensive Metabolic Panel  Other fatigue  -     CBC with Auto Differential  -     TSH  -     T4, Free  Prediabetes  -     Hemoglobin A1C      Return in 1 year (on 2026) for CPE (Physical Exam).       Subjective   History:  Patient presents today for well check. Denies any concerns. 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 Up to date on screenings. Lives at home with  and kids. Regular periods and on BC. Denies any visual or hearing issues. Denies any urinary or bowel issues. Denies any chronic or acute issues.     Review of Systems   Constitutional:  Negative for activity change, appetite change, chills, fatigue and fever.   HENT:  Negative for congestion, ear discharge, ear pain, hearing loss, postnasal drip, rhinorrhea, sinus pressure, sinus pain, sneezing, sore throat, tinnitus and trouble swallowing.    Eyes: Negative.    Respiratory: Negative.     Cardiovascular: Negative.    Gastrointestinal:  Negative for abdominal pain, constipation, diarrhea, nausea and vomiting.   Genitourinary:  Negative for decreased urine volume, difficulty urinating, dysuria, flank pain, frequency, hematuria and urgency.   Musculoskeletal:  Negative for arthralgias, back pain, joint swelling, myalgias and neck pain.   Skin:  Negative for color change, rash and wound.   Allergic/Immunologic: Negative.    Neurological:  Negative for dizziness, weakness, light-headedness and headaches.

## 2025-01-29 LAB
EST. AVERAGE GLUCOSE BLD GHB EST-MCNC: 85.3 MG/DL
HBA1C MFR BLD: 4.6 %

## 2025-05-21 ENCOUNTER — OFFICE VISIT (OUTPATIENT)
Dept: FAMILY MEDICINE CLINIC | Age: 40
End: 2025-05-21

## 2025-05-21 ENCOUNTER — TELEPHONE (OUTPATIENT)
Dept: FAMILY MEDICINE CLINIC | Age: 40
End: 2025-05-21

## 2025-05-21 VITALS
HEART RATE: 96 BPM | SYSTOLIC BLOOD PRESSURE: 124 MMHG | BODY MASS INDEX: 27 KG/M2 | OXYGEN SATURATION: 99 % | DIASTOLIC BLOOD PRESSURE: 78 MMHG | HEIGHT: 66 IN | WEIGHT: 168 LBS

## 2025-05-21 DIAGNOSIS — R39.9 UTI SYMPTOMS: Primary | ICD-10-CM

## 2025-05-21 LAB
BILIRUBIN, POC: NORMAL
BLOOD URINE, POC: NORMAL
CLARITY, POC: CLEAR
COLOR, POC: NORMAL
GLUCOSE URINE, POC: NORMAL MG/DL
KETONES, POC: NORMAL MG/DL
LEUKOCYTE EST, POC: NORMAL
NITRITE, POC: POSITIVE
PH, POC: 5.5
PROTEIN, POC: NORMAL MG/DL
SPECIFIC GRAVITY, POC: 1
UROBILINOGEN, POC: 0.2 MG/DL

## 2025-05-21 RX ORDER — NITROFURANTOIN 25; 75 MG/1; MG/1
100 CAPSULE ORAL 2 TIMES DAILY
Qty: 14 CAPSULE | Refills: 0 | Status: SHIPPED | OUTPATIENT
Start: 2025-05-21 | End: 2025-05-28

## 2025-05-21 NOTE — PROGRESS NOTES
Elizabeth POOL Juan (:  1985) is a 39 y.o. female,Established patient, here for evaluation of the following chief complaint(s):  Urinary Tract Infection (Pain urination, started yesterday)         Assessment & Plan  UTI symptoms     Results for orders placed or performed in visit on 25   POCT Urinalysis no Micro   Result Value Ref Range    Color, UA orange     Clarity, UA clear     Glucose, UA POC neg mg/dL    Bilirubin, UA neg     Ketones, UA neg mg/dL    Spec Grav, UA 1.005     Blood, UA POC trace     pH, UA 5.5     Protein, UA POC neg mg/dL    Urobilinogen, UA 0.2 mg/dL    Leukocytes, UA trace     Nitrite, UA positive      Orders:    POCT Urinalysis no Micro    Culture, Urine    nitrofurantoin, macrocrystal-monohydrate, (MACROBID) 100 MG capsule; Take 1 capsule by mouth 2 times daily for 7 days      No follow-ups on file.       Subjective   HPI    Patient presents today for uti sx. States sx started yesterday. States malodorous urine, slight cramping, pain with urination. Denies lower back pain, fevers, aches or chills. Tried AZO with some relief.     Review of Systems   See HPI    Objective   Physical Exam  Vitals and nursing note reviewed.   Constitutional:       Appearance: Normal appearance.   Abdominal:      Tenderness: There is no right CVA tenderness or left CVA tenderness.   Musculoskeletal:         General: Normal range of motion.   Skin:     General: Skin is warm and dry.   Neurological:      General: No focal deficit present.      Mental Status: She is alert and oriented to person, place, and time.            On this date 2025 I have spent 30 minutes reviewing previous notes, test results and face to face with the patient discussing the diagnosis and importance of compliance with the treatment plan as well as documenting on the day of the visit.      An electronic signature was used to authenticate this note.    --Elizabeth Anders, LEN - CNP

## 2025-05-21 NOTE — TELEPHONE ENCOUNTER
Called pt to adjust her My Chart office visit at 1:30pm due to the symptoms in appt notes not being able to be diagnosed through virtual visits. Pt was advised to call office back to schedule an in office appointment.

## 2025-05-23 ENCOUNTER — RESULTS FOLLOW-UP (OUTPATIENT)
Dept: FAMILY MEDICINE CLINIC | Age: 40
End: 2025-05-23

## 2025-05-23 LAB
BACTERIA UR CULT: ABNORMAL
ORGANISM: ABNORMAL

## 2025-05-28 ENCOUNTER — OFFICE VISIT (OUTPATIENT)
Dept: FAMILY MEDICINE CLINIC | Age: 40
End: 2025-05-28

## 2025-05-28 VITALS
WEIGHT: 168 LBS | HEIGHT: 66 IN | SYSTOLIC BLOOD PRESSURE: 124 MMHG | OXYGEN SATURATION: 99 % | BODY MASS INDEX: 27 KG/M2 | DIASTOLIC BLOOD PRESSURE: 74 MMHG | HEART RATE: 89 BPM

## 2025-05-28 DIAGNOSIS — R35.0 URINARY FREQUENCY: Primary | ICD-10-CM

## 2025-05-28 RX ORDER — CIPROFLOXACIN 500 MG/1
500 TABLET, FILM COATED ORAL 2 TIMES DAILY
Qty: 10 TABLET | Refills: 0 | Status: SHIPPED | OUTPATIENT
Start: 2025-05-28 | End: 2025-06-02

## 2025-05-28 NOTE — PROGRESS NOTES
Elizabeth Thompsonci (:  1985) is a 39 y.o. female,Established patient, here for evaluation of the following chief complaint(s):  Urinary Tract Infection (Still having urgency)         Assessment & Plan  Urinary frequency   - finished round of macrobid - will send in another 5 days of medication to improve sx  - push fluids, tylenol as needed      Orders:    ciprofloxacin (CIPRO) 500 MG tablet; Take 1 tablet by mouth 2 times daily for 5 days      No follow-ups on file.       Subjective   HPI    Patient presents today for continued UTI sx. States she was seen last week for a UTI and urine dip was positive for an infection and culture  came back with bacteria. States she was given macrobid and finished the medication but states that she is still having slight urgency and lower cramping. Denies any other sx. Leaving for a cruise on Saturday.     Review of Systems   See HPI    Objective   Physical Exam  Vitals and nursing note reviewed.   Constitutional:       Appearance: Normal appearance.   Pulmonary:      Effort: Pulmonary effort is normal.      Breath sounds: Normal breath sounds.   Abdominal:      Tenderness: There is no abdominal tenderness. There is no right CVA tenderness or left CVA tenderness.   Musculoskeletal:         General: Normal range of motion.   Skin:     General: Skin is warm and dry.   Neurological:      General: No focal deficit present.      Mental Status: She is alert and oriented to person, place, and time.            On this date 2025 I have spent 25 minutes reviewing previous notes, test results and face to face with the patient discussing the diagnosis and importance of compliance with the treatment plan as well as documenting on the day of the visit.      An electronic signature was used to authenticate this note.    --Elizabeth Anders, APRN - CNP

## (undated) DEVICE — BLADE ES ELASTOMERIC COAT INSUL DURABLE BEND UPTO 90DEG

## (undated) DEVICE — DRAPE,T,LAPARO,TRANS,STERILE: Brand: MEDLINE

## (undated) DEVICE — SOLUTION IV 1000ML 0.9% SOD CHL

## (undated) DEVICE — MEDI-VAC NON-CONDUCTIVE SUCTION TUBING: Brand: CARDINAL HEALTH

## (undated) DEVICE — INTENDED FOR TISSUE SEPARATION, AND OTHER PROCEDURES THAT REQUIRE A SHARP SURGICAL BLADE TO PUNCTURE OR CUT.: Brand: BARD-PARKER ® CARBON RIB-BACK BLADES

## (undated) DEVICE — GAUZE,SPONGE,4"X4",16PLY,XRAY,STRL,LF: Brand: MEDLINE

## (undated) DEVICE — TURNOVER KIT RM INF CTRL TECH

## (undated) DEVICE — SURGICAL SET UP - SURE SET: Brand: MEDLINE INDUSTRIES, INC.

## (undated) DEVICE — PENCIL ES L3M ROCK SWCH S STL HEX LOK BLDE ELECTRD HOLSTER

## (undated) DEVICE — SURE SET-DOUBLE BASIN-LF: Brand: MEDLINE INDUSTRIES, INC.

## (undated) DEVICE — PACK,BASIC: Brand: MEDLINE

## (undated) DEVICE — GLOVE SURG SZ 7 L12IN FNGR THK75MIL WHT LTX POLYMER BEAD

## (undated) DEVICE — SYRINGE IRRIG 60ML SFT PLIABLE BLB EZ TO GRP 1 HND USE W/

## (undated) DEVICE — SUTURE VCRL SZ 3-0 L18IN ABSRB UD L26MM SH 1/2 CIR J864D

## (undated) DEVICE — COVER LT HNDL BLU PLAS

## (undated) DEVICE — CHLORAPREP 26ML ORANGE

## (undated) DEVICE — GOWN,SIRUS,POLYRNF,SETINSLV,L,20/CS: Brand: MEDLINE

## (undated) DEVICE — STANDARD HYPODERMIC NEEDLE,POLYPROPYLENE HUB: Brand: MONOJECT

## (undated) DEVICE — SUTURE VCRL SZ 2-0 L18IN ABSRB VLT L26MM SH 1/2 CIR J775D

## (undated) DEVICE — GARMENT,MEDLINE,DVT,INT,CALF,MED, GEN2: Brand: MEDLINE

## (undated) DEVICE — GOWN,SIRUS,POLYRNF,SETINSLV,XL,20/CS: Brand: MEDLINE

## (undated) DEVICE — SYRINGE MED 10ML TRNSLUC BRL PLUNG BLK MRK POLYPR CTRL

## (undated) DEVICE — ELECTRODE PT RET AD L9FT HI MOIST COND ADH HYDRGEL CORDED

## (undated) DEVICE — SPONGE,LAP,18"X18",DLX,XR,ST,5/PK,40/PK: Brand: MEDLINE

## (undated) DEVICE — GLOVE SURG SZ 7 L12IN FNGR THK87MIL DK GRN LTX POLYMER W

## (undated) DEVICE — SYSTEM SKIN CLSR 22CM DERMBND PRINEO